# Patient Record
Sex: FEMALE | Race: OTHER | HISPANIC OR LATINO
[De-identification: names, ages, dates, MRNs, and addresses within clinical notes are randomized per-mention and may not be internally consistent; named-entity substitution may affect disease eponyms.]

---

## 2018-07-19 ENCOUNTER — APPOINTMENT (OUTPATIENT)
Dept: SPINE | Facility: CLINIC | Age: 48
End: 2018-07-19
Payer: OTHER MISCELLANEOUS

## 2018-07-19 VITALS
WEIGHT: 150 LBS | SYSTOLIC BLOOD PRESSURE: 122 MMHG | HEIGHT: 63 IN | DIASTOLIC BLOOD PRESSURE: 74 MMHG | BODY MASS INDEX: 26.58 KG/M2

## 2018-07-19 DIAGNOSIS — Z80.9 FAMILY HISTORY OF MALIGNANT NEOPLASM, UNSPECIFIED: ICD-10-CM

## 2018-07-19 DIAGNOSIS — Z82.49 FAMILY HISTORY OF ISCHEMIC HEART DISEASE AND OTHER DISEASES OF THE CIRCULATORY SYSTEM: ICD-10-CM

## 2018-07-19 DIAGNOSIS — Z87.09 PERSONAL HISTORY OF OTHER DISEASES OF THE RESPIRATORY SYSTEM: ICD-10-CM

## 2018-07-19 PROCEDURE — 99205 OFFICE O/P NEW HI 60 MIN: CPT

## 2018-07-19 RX ORDER — IBUPROFEN 200 MG/1
TABLET, FILM COATED ORAL
Refills: 0 | Status: ACTIVE | COMMUNITY

## 2018-07-19 RX ORDER — ACETAMINOPHEN 325 MG/1
TABLET, FILM COATED ORAL
Refills: 0 | Status: ACTIVE | COMMUNITY

## 2018-08-02 ENCOUNTER — APPOINTMENT (OUTPATIENT)
Dept: SPINE | Facility: CLINIC | Age: 48
End: 2018-08-02
Payer: OTHER MISCELLANEOUS

## 2018-08-02 VITALS
WEIGHT: 156 LBS | HEIGHT: 63 IN | SYSTOLIC BLOOD PRESSURE: 124 MMHG | BODY MASS INDEX: 27.64 KG/M2 | DIASTOLIC BLOOD PRESSURE: 70 MMHG

## 2018-08-02 PROCEDURE — 99213 OFFICE O/P EST LOW 20 MIN: CPT

## 2018-08-29 ENCOUNTER — OUTPATIENT (OUTPATIENT)
Dept: OUTPATIENT SERVICES | Facility: HOSPITAL | Age: 48
LOS: 1 days | End: 2018-08-29
Payer: COMMERCIAL

## 2018-08-29 VITALS
SYSTOLIC BLOOD PRESSURE: 111 MMHG | WEIGHT: 153 LBS | HEIGHT: 62.6 IN | RESPIRATION RATE: 16 BRPM | HEART RATE: 55 BPM | DIASTOLIC BLOOD PRESSURE: 75 MMHG | TEMPERATURE: 98 F | OXYGEN SATURATION: 99 %

## 2018-08-29 DIAGNOSIS — Z78.9 OTHER SPECIFIED HEALTH STATUS: ICD-10-CM

## 2018-08-29 DIAGNOSIS — Z86.018 PERSONAL HISTORY OF OTHER BENIGN NEOPLASM: Chronic | ICD-10-CM

## 2018-08-29 DIAGNOSIS — Z98.51 TUBAL LIGATION STATUS: Chronic | ICD-10-CM

## 2018-08-29 DIAGNOSIS — Z29.9 ENCOUNTER FOR PROPHYLACTIC MEASURES, UNSPECIFIED: ICD-10-CM

## 2018-08-29 DIAGNOSIS — G95.9 DISEASE OF SPINAL CORD, UNSPECIFIED: ICD-10-CM

## 2018-08-29 DIAGNOSIS — M48.02 SPINAL STENOSIS, CERVICAL REGION: ICD-10-CM

## 2018-08-29 DIAGNOSIS — Z90.710 ACQUIRED ABSENCE OF BOTH CERVIX AND UTERUS: Chronic | ICD-10-CM

## 2018-08-29 DIAGNOSIS — Z98.890 OTHER SPECIFIED POSTPROCEDURAL STATES: Chronic | ICD-10-CM

## 2018-08-29 DIAGNOSIS — Z01.818 ENCOUNTER FOR OTHER PREPROCEDURAL EXAMINATION: ICD-10-CM

## 2018-08-29 LAB
BLD GP AB SCN SERPL QL: NEGATIVE — SIGNIFICANT CHANGE UP
HCT VFR BLD CALC: 38.5 % — SIGNIFICANT CHANGE UP (ref 34.5–45)
HGB BLD-MCNC: 12.7 G/DL — SIGNIFICANT CHANGE UP (ref 11.5–15.5)
MCHC RBC-ENTMCNC: 29.7 PG — SIGNIFICANT CHANGE UP (ref 27–34)
MCHC RBC-ENTMCNC: 33 GM/DL — SIGNIFICANT CHANGE UP (ref 32–36)
MCV RBC AUTO: 90 FL — SIGNIFICANT CHANGE UP (ref 80–100)
MRSA PCR RESULT.: SIGNIFICANT CHANGE UP
PLATELET # BLD AUTO: 150 K/UL — SIGNIFICANT CHANGE UP (ref 150–400)
RBC # BLD: 4.28 M/UL — SIGNIFICANT CHANGE UP (ref 3.8–5.2)
RBC # FLD: 13.4 % — SIGNIFICANT CHANGE UP (ref 10.3–14.5)
RH IG SCN BLD-IMP: POSITIVE — SIGNIFICANT CHANGE UP
S AUREUS DNA NOSE QL NAA+PROBE: DETECTED
WBC # BLD: 4.8 K/UL — SIGNIFICANT CHANGE UP (ref 3.8–10.5)
WBC # FLD AUTO: 4.8 K/UL — SIGNIFICANT CHANGE UP (ref 3.8–10.5)

## 2018-08-29 PROCEDURE — 87640 STAPH A DNA AMP PROBE: CPT

## 2018-08-29 PROCEDURE — 87641 MR-STAPH DNA AMP PROBE: CPT

## 2018-08-29 PROCEDURE — 86900 BLOOD TYPING SEROLOGIC ABO: CPT

## 2018-08-29 PROCEDURE — 86850 RBC ANTIBODY SCREEN: CPT

## 2018-08-29 PROCEDURE — 86901 BLOOD TYPING SEROLOGIC RH(D): CPT

## 2018-08-29 PROCEDURE — 85027 COMPLETE CBC AUTOMATED: CPT

## 2018-08-29 PROCEDURE — G0463: CPT

## 2018-08-29 RX ORDER — CEFAZOLIN SODIUM 1 G
2000 VIAL (EA) INJECTION ONCE
Qty: 0 | Refills: 0 | Status: DISCONTINUED | OUTPATIENT
Start: 2018-09-07 | End: 2018-09-10

## 2018-08-29 NOTE — H&P PST ADULT - HISTORY OF PRESENT ILLNESS
49 yo Vietnamese speaking female from [place], reports an injury while at work on 5/23/18 while cleaning a toilet a piece of ceramic broke and a piece hit her on the head knocking her backward into the wall, ever since the accident she's had dizziness and bilateral arm weakness and tingling. CT scan revealed cervical cord stenosis and cord impingement C4-5 and C5-6. Pt. presents to PST today for scheduled C4-6 Anterior Cervical Discectomy and Fusion on 9/7/18. Pt. denies recent fever, chills, chest pain, SOB, changes in bowel/urinary habits.

## 2018-08-29 NOTE — H&P PST ADULT - PSH
H/O lipoma  excision, under chin  H/O vein stripping    S/P hysterectomy  2/2 fibroids 2017  S/P tubal ligation

## 2018-08-29 NOTE — H&P PST ADULT - ASSESSMENT
CAPRINI SCORE [CLOT]    AGE RELATED RISK FACTORS                                                       MOBILITY RELATED FACTORS  [x ] Age 41-60 years                                            (1 Point)                  [ ] Bed rest                                                        (1 Point)  [ ] Age: 61-74 years                                           (2 Points)                 [ ] Plaster cast                                                   (2 Points)  [ ] Age= 75 years                                              (3 Points)                 [ ] Bed bound for more than 72 hours                 (2 Points)    DISEASE RELATED RISK FACTORS                                               GENDER SPECIFIC FACTORS  [ ] Edema in the lower extremities                       (1 Point)                  [ ] Pregnancy                                                     (1 Point)  [ ] Varicose veins                                               (1 Point)                  [ ] Post-partum < 6 weeks                                   (1 Point)             [x ] BMI > 25 Kg/m2                                            (1 Point)                  [ ] Hormonal therapy  or oral contraception          (1 Point)                 [ ] Sepsis (in the previous month)                        (1 Point)                  [ ] History of pregnancy complications                 (1 point)  [ ] Pneumonia or serious lung disease                                               [ ] Unexplained or recurrent                     (1 Point)           (in the previous month)                               (1 Point)  [ ] Abnormal pulmonary function test                     (1 Point)                 SURGERY RELATED RISK FACTORS  [ ] Acute myocardial infarction                              (1 Point)                 [ ]  Section                                             (1 Point)  [ ] Congestive heart failure (in the previous month)  (1 Point)               [ ] Minor surgery                                                  (1 Point)   [ ] Inflammatory bowel disease                             (1 Point)                 [ ] Arthroscopic surgery                                        (2 Points)  [ ] Central venous access                                      (2 Points)                [x ] General surgery lasting more than 45 minutes   (2 Points)       [ ] Stroke (in the previous month)                          (5 Points)               [ ] Elective arthroplasty                                         (5 Points)                                                                                                                                               HEMATOLOGY RELATED FACTORS                                                 TRAUMA RELATED RISK FACTORS  [ ] Prior episodes of VTE                                     (3 Points)                [ ] Fracture of the hip, pelvis, or leg                       (5 Points)  [ ] Positive family history for VTE                         (3 Points)                 [ ] Acute spinal cord injury (in the previous month)  (5 Points)  [ ] Prothrombin 14931 A                                     (3 Points)                 [ ] Paralysis  (less than 1 month)                             (5 Points)  [ ] Factor V Leiden                                             (3 Points)                  [ ] Multiple Trauma within 1 month                        (5 Points)  [ ] Lupus anticoagulants                                     (3 Points)                                                           [ ] Anticardiolipin antibodies                               (3 Points)                                                       [ ] High homocysteine in the blood                      (3 Points)                                             [ ] Other congenital or acquired thrombophilia      (3 Points)                                                [ ] Heparin induced thrombocytopenia                  (3 Points)                                          Total Score [    4      ]

## 2018-08-29 NOTE — H&P PST ADULT - PMH
Asthma  resolved, does not use any inhalers (hospitilized 12 years ago for exacerbation while living in [place])  Cervical stenosis of spinal canal  and nerve impigement Asthma  resolved, does not use any inhalers (hospitilized 12 years ago for exacerbation while living in [place])  Cervical stenosis of spinal canal  and nerve impigement  Constipation  manages with diet

## 2018-08-29 NOTE — H&P PST ADULT - PROBLEM SELECTOR PLAN 1
C4-6 anterior Cervical Discectomy and Fusion  Pre-op instructions, including Chlorhexidine soap, provided - all questions answered

## 2018-08-29 NOTE — H&P PST ADULT - NSANTHOSAYNRD_GEN_A_CORE
No. PATRICE screening performed.  STOP BANG Legend: 0-2 = LOW Risk; 3-4 = INTERMEDIATE Risk; 5-8 = HIGH Risk

## 2018-08-31 ENCOUNTER — CHART COPY (OUTPATIENT)
Age: 48
End: 2018-08-31

## 2018-09-05 PROBLEM — J45.909 UNSPECIFIED ASTHMA, UNCOMPLICATED: Chronic | Status: ACTIVE | Noted: 2018-08-29

## 2018-09-05 PROBLEM — M48.02 SPINAL STENOSIS, CERVICAL REGION: Chronic | Status: ACTIVE | Noted: 2018-08-29

## 2018-09-05 PROBLEM — K59.00 CONSTIPATION, UNSPECIFIED: Chronic | Status: ACTIVE | Noted: 2018-08-29

## 2018-09-06 ENCOUNTER — NON-APPOINTMENT (OUTPATIENT)
Age: 48
End: 2018-09-06

## 2018-09-06 ENCOUNTER — APPOINTMENT (OUTPATIENT)
Dept: FAMILY MEDICINE | Facility: CLINIC | Age: 48
End: 2018-09-06
Payer: OTHER MISCELLANEOUS

## 2018-09-06 ENCOUNTER — TRANSCRIPTION ENCOUNTER (OUTPATIENT)
Age: 48
End: 2018-09-06

## 2018-09-06 DIAGNOSIS — Z92.89 PERSONAL HISTORY OF OTHER MEDICAL TREATMENT: ICD-10-CM

## 2018-09-06 DIAGNOSIS — Z82.3 FAMILY HISTORY OF STROKE: ICD-10-CM

## 2018-09-06 PROCEDURE — 99204 OFFICE O/P NEW MOD 45 MIN: CPT | Mod: 25

## 2018-09-06 PROCEDURE — 36415 COLL VENOUS BLD VENIPUNCTURE: CPT

## 2018-09-06 RX ORDER — NITROFURANTOIN (MONOHYDRATE/MACROCRYSTALS) 25; 75 MG/1; MG/1
100 CAPSULE ORAL
Qty: 14 | Refills: 0 | Status: COMPLETED | COMMUNITY
Start: 2018-05-14

## 2018-09-06 RX ORDER — CYCLOBENZAPRINE HYDROCHLORIDE 10 MG/1
10 TABLET, FILM COATED ORAL
Qty: 10 | Refills: 0 | Status: COMPLETED | COMMUNITY
Start: 2018-05-25

## 2018-09-06 RX ORDER — CIPROFLOXACIN HYDROCHLORIDE 500 MG/1
500 TABLET, FILM COATED ORAL
Qty: 10 | Refills: 0 | Status: COMPLETED | COMMUNITY
Start: 2018-05-14

## 2018-09-06 NOTE — PHYSICAL EXAM

## 2018-09-06 NOTE — HISTORY OF PRESENT ILLNESS
[No Pertinent Cardiac History] : no history of aortic stenosis, atrial fibrillation, coronary artery disease, recent myocardial infarction, or implantable device/pacemaker [FreeTextEntry1] : c4-6 Anterior Cervical Discectomy & fusion [FreeTextEntry2] : 9/7/18 [FreeTextEntry3] : Dr. Gonzalez Montgomery [FreeTextEntry4] : s/p accident at work\par c/o neck, back, leg pain, headache, rt hand and leg weakness\par unable to sit or stand for prolonged periods of time

## 2018-09-07 ENCOUNTER — INPATIENT (INPATIENT)
Facility: HOSPITAL | Age: 48
LOS: 2 days | Discharge: HOME | DRG: 472 | End: 2018-09-10
Attending: NEUROLOGICAL SURGERY | Admitting: NEUROLOGICAL SURGERY
Payer: COMMERCIAL

## 2018-09-07 ENCOUNTER — APPOINTMENT (OUTPATIENT)
Dept: SPINE | Facility: HOSPITAL | Age: 48
End: 2018-09-07

## 2018-09-07 VITALS
WEIGHT: 156.09 LBS | TEMPERATURE: 98 F | DIASTOLIC BLOOD PRESSURE: 80 MMHG | HEART RATE: 60 BPM | RESPIRATION RATE: 18 BRPM | HEIGHT: 63 IN | OXYGEN SATURATION: 100 % | SYSTOLIC BLOOD PRESSURE: 124 MMHG

## 2018-09-07 DIAGNOSIS — Z98.890 OTHER SPECIFIED POSTPROCEDURAL STATES: Chronic | ICD-10-CM

## 2018-09-07 DIAGNOSIS — Z90.710 ACQUIRED ABSENCE OF BOTH CERVIX AND UTERUS: Chronic | ICD-10-CM

## 2018-09-07 DIAGNOSIS — Z98.51 TUBAL LIGATION STATUS: Chronic | ICD-10-CM

## 2018-09-07 DIAGNOSIS — G95.9 DISEASE OF SPINAL CORD, UNSPECIFIED: ICD-10-CM

## 2018-09-07 DIAGNOSIS — Z86.018 PERSONAL HISTORY OF OTHER BENIGN NEOPLASM: Chronic | ICD-10-CM

## 2018-09-07 LAB
ANION GAP SERPL CALC-SCNC: 12 MMOL/L — SIGNIFICANT CHANGE UP (ref 5–17)
BASOPHILS # BLD AUTO: 0 K/UL — SIGNIFICANT CHANGE UP (ref 0–0.2)
BASOPHILS NFR BLD AUTO: 0.3 % — SIGNIFICANT CHANGE UP (ref 0–2)
BUN SERPL-MCNC: 10 MG/DL — SIGNIFICANT CHANGE UP (ref 7–23)
CALCIUM SERPL-MCNC: 9.1 MG/DL — SIGNIFICANT CHANGE UP (ref 8.4–10.5)
CHLORIDE SERPL-SCNC: 104 MMOL/L — SIGNIFICANT CHANGE UP (ref 96–108)
CO2 SERPL-SCNC: 24 MMOL/L — SIGNIFICANT CHANGE UP (ref 22–31)
CREAT SERPL-MCNC: 0.8 MG/DL — SIGNIFICANT CHANGE UP (ref 0.5–1.3)
EOSINOPHIL # BLD AUTO: 0 K/UL — SIGNIFICANT CHANGE UP (ref 0–0.5)
EOSINOPHIL NFR BLD AUTO: 0.5 % — SIGNIFICANT CHANGE UP (ref 0–6)
GLUCOSE BLDC GLUCOMTR-MCNC: 119 MG/DL — HIGH (ref 70–99)
GLUCOSE SERPL-MCNC: 108 MG/DL — HIGH (ref 70–99)
HCT VFR BLD CALC: 39.1 % — SIGNIFICANT CHANGE UP (ref 34.5–45)
HGB BLD-MCNC: 13.3 G/DL — SIGNIFICANT CHANGE UP (ref 11.5–15.5)
LYMPHOCYTES # BLD AUTO: 1.2 K/UL — SIGNIFICANT CHANGE UP (ref 1–3.3)
LYMPHOCYTES # BLD AUTO: 18.8 % — SIGNIFICANT CHANGE UP (ref 13–44)
MCHC RBC-ENTMCNC: 31 PG — SIGNIFICANT CHANGE UP (ref 27–34)
MCHC RBC-ENTMCNC: 34.2 GM/DL — SIGNIFICANT CHANGE UP (ref 32–36)
MCV RBC AUTO: 90.7 FL — SIGNIFICANT CHANGE UP (ref 80–100)
MONOCYTES # BLD AUTO: 0.2 K/UL — SIGNIFICANT CHANGE UP (ref 0–0.9)
MONOCYTES NFR BLD AUTO: 2.5 % — SIGNIFICANT CHANGE UP (ref 2–14)
NEUTROPHILS # BLD AUTO: 4.9 K/UL — SIGNIFICANT CHANGE UP (ref 1.8–7.4)
NEUTROPHILS NFR BLD AUTO: 77.8 % — HIGH (ref 43–77)
PLATELET # BLD AUTO: 134 K/UL — LOW (ref 150–400)
POTASSIUM SERPL-MCNC: 3.8 MMOL/L — SIGNIFICANT CHANGE UP (ref 3.5–5.3)
POTASSIUM SERPL-SCNC: 3.8 MMOL/L — SIGNIFICANT CHANGE UP (ref 3.5–5.3)
RBC # BLD: 4.31 M/UL — SIGNIFICANT CHANGE UP (ref 3.8–5.2)
RBC # FLD: 12.6 % — SIGNIFICANT CHANGE UP (ref 10.3–14.5)
RH IG SCN BLD-IMP: POSITIVE — SIGNIFICANT CHANGE UP
SODIUM SERPL-SCNC: 140 MMOL/L — SIGNIFICANT CHANGE UP (ref 135–145)
WBC # BLD: 6.2 K/UL — SIGNIFICANT CHANGE UP (ref 3.8–10.5)
WBC # FLD AUTO: 6.2 K/UL — SIGNIFICANT CHANGE UP (ref 3.8–10.5)

## 2018-09-07 PROCEDURE — 99291 CRITICAL CARE FIRST HOUR: CPT

## 2018-09-07 PROCEDURE — 22853 INSJ BIOMECHANICAL DEVICE: CPT | Mod: 59,GC

## 2018-09-07 PROCEDURE — 22552 ARTHRD ANT NTRBD CERVICAL EA: CPT | Mod: GC

## 2018-09-07 PROCEDURE — 22551 ARTHRD ANT NTRBDY CERVICAL: CPT | Mod: GC

## 2018-09-07 RX ORDER — GABAPENTIN 400 MG/1
300 CAPSULE ORAL AT BEDTIME
Qty: 0 | Refills: 0 | Status: DISCONTINUED | OUTPATIENT
Start: 2018-09-07 | End: 2018-09-10

## 2018-09-07 RX ORDER — ENOXAPARIN SODIUM 100 MG/ML
40 INJECTION SUBCUTANEOUS DAILY
Qty: 0 | Refills: 0 | Status: DISCONTINUED | OUTPATIENT
Start: 2018-09-08 | End: 2018-09-10

## 2018-09-07 RX ORDER — ONDANSETRON 8 MG/1
4 TABLET, FILM COATED ORAL ONCE
Qty: 0 | Refills: 0 | Status: DISCONTINUED | OUTPATIENT
Start: 2018-09-07 | End: 2018-09-08

## 2018-09-07 RX ORDER — DEXTROSE MONOHYDRATE, SODIUM CHLORIDE, AND POTASSIUM CHLORIDE 50; .745; 4.5 G/1000ML; G/1000ML; G/1000ML
1000 INJECTION, SOLUTION INTRAVENOUS
Qty: 0 | Refills: 0 | Status: DISCONTINUED | OUTPATIENT
Start: 2018-09-07 | End: 2018-09-10

## 2018-09-07 RX ORDER — OXYCODONE HYDROCHLORIDE 5 MG/1
5 TABLET ORAL EVERY 4 HOURS
Qty: 0 | Refills: 0 | Status: DISCONTINUED | OUTPATIENT
Start: 2018-09-07 | End: 2018-09-08

## 2018-09-07 RX ORDER — DEXAMETHASONE 0.5 MG/5ML
4 ELIXIR ORAL EVERY 6 HOURS
Qty: 0 | Refills: 0 | Status: DISCONTINUED | OUTPATIENT
Start: 2018-09-07 | End: 2018-09-07

## 2018-09-07 RX ORDER — GABAPENTIN 400 MG/1
1 CAPSULE ORAL
Qty: 0 | Refills: 0 | COMMUNITY

## 2018-09-07 RX ORDER — ACETAMINOPHEN 500 MG
650 TABLET ORAL EVERY 6 HOURS
Qty: 0 | Refills: 0 | Status: DISCONTINUED | OUTPATIENT
Start: 2018-09-07 | End: 2018-09-08

## 2018-09-07 RX ORDER — DEXAMETHASONE 0.5 MG/5ML
4 ELIXIR ORAL EVERY 6 HOURS
Qty: 0 | Refills: 0 | Status: COMPLETED | OUTPATIENT
Start: 2018-09-07 | End: 2018-09-08

## 2018-09-07 RX ORDER — LIDOCAINE HCL 20 MG/ML
0.2 VIAL (ML) INJECTION ONCE
Qty: 0 | Refills: 0 | Status: DISCONTINUED | OUTPATIENT
Start: 2018-09-07 | End: 2018-09-07

## 2018-09-07 RX ORDER — INFLUENZA VIRUS VACCINE 15; 15; 15; 15 UG/.5ML; UG/.5ML; UG/.5ML; UG/.5ML
0.5 SUSPENSION INTRAMUSCULAR ONCE
Qty: 0 | Refills: 0 | Status: DISCONTINUED | OUTPATIENT
Start: 2018-09-07 | End: 2018-09-10

## 2018-09-07 RX ORDER — PANTOPRAZOLE SODIUM 20 MG/1
40 TABLET, DELAYED RELEASE ORAL
Qty: 0 | Refills: 0 | Status: DISCONTINUED | OUTPATIENT
Start: 2018-09-07 | End: 2018-09-10

## 2018-09-07 RX ORDER — HYDROMORPHONE HYDROCHLORIDE 2 MG/ML
0.5 INJECTION INTRAMUSCULAR; INTRAVENOUS; SUBCUTANEOUS
Qty: 0 | Refills: 0 | Status: DISCONTINUED | OUTPATIENT
Start: 2018-09-07 | End: 2018-09-08

## 2018-09-07 RX ORDER — SODIUM CHLORIDE 9 MG/ML
3 INJECTION INTRAMUSCULAR; INTRAVENOUS; SUBCUTANEOUS EVERY 8 HOURS
Qty: 0 | Refills: 0 | Status: DISCONTINUED | OUTPATIENT
Start: 2018-09-07 | End: 2018-09-07

## 2018-09-07 RX ORDER — ACETAMINOPHEN 500 MG
1000 TABLET ORAL ONCE
Qty: 0 | Refills: 0 | Status: COMPLETED | OUTPATIENT
Start: 2018-09-07 | End: 2018-09-07

## 2018-09-07 RX ORDER — DOCUSATE SODIUM 100 MG
100 CAPSULE ORAL THREE TIMES A DAY
Qty: 0 | Refills: 0 | Status: DISCONTINUED | OUTPATIENT
Start: 2018-09-07 | End: 2018-09-10

## 2018-09-07 RX ORDER — HYDROMORPHONE HYDROCHLORIDE 2 MG/ML
0.25 INJECTION INTRAMUSCULAR; INTRAVENOUS; SUBCUTANEOUS
Qty: 0 | Refills: 0 | Status: DISCONTINUED | OUTPATIENT
Start: 2018-09-07 | End: 2018-09-08

## 2018-09-07 RX ORDER — CEFAZOLIN SODIUM 1 G
2000 VIAL (EA) INJECTION EVERY 8 HOURS
Qty: 0 | Refills: 0 | Status: COMPLETED | OUTPATIENT
Start: 2018-09-07 | End: 2018-09-08

## 2018-09-07 RX ADMIN — DEXTROSE MONOHYDRATE, SODIUM CHLORIDE, AND POTASSIUM CHLORIDE 75 MILLILITER(S): 50; .745; 4.5 INJECTION, SOLUTION INTRAVENOUS at 17:06

## 2018-09-07 RX ADMIN — Medication 400 MILLIGRAM(S): at 20:14

## 2018-09-07 RX ADMIN — Medication 1000 MILLIGRAM(S): at 20:45

## 2018-09-07 RX ADMIN — Medication 4 MILLIGRAM(S): at 23:46

## 2018-09-07 RX ADMIN — HYDROMORPHONE HYDROCHLORIDE 0.5 MILLIGRAM(S): 2 INJECTION INTRAMUSCULAR; INTRAVENOUS; SUBCUTANEOUS at 15:35

## 2018-09-07 RX ADMIN — Medication 4 MILLIGRAM(S): at 18:38

## 2018-09-07 RX ADMIN — HYDROMORPHONE HYDROCHLORIDE 0.5 MILLIGRAM(S): 2 INJECTION INTRAMUSCULAR; INTRAVENOUS; SUBCUTANEOUS at 16:17

## 2018-09-07 RX ADMIN — HYDROMORPHONE HYDROCHLORIDE 0.5 MILLIGRAM(S): 2 INJECTION INTRAMUSCULAR; INTRAVENOUS; SUBCUTANEOUS at 15:21

## 2018-09-07 RX ADMIN — HYDROMORPHONE HYDROCHLORIDE 0.5 MILLIGRAM(S): 2 INJECTION INTRAMUSCULAR; INTRAVENOUS; SUBCUTANEOUS at 16:47

## 2018-09-07 RX ADMIN — Medication 100 MILLIGRAM(S): at 21:31

## 2018-09-07 NOTE — PROGRESS NOTE ADULT - SUBJECTIVE AND OBJECTIVE BOX
SUMMARY:  48 year-old [facility] woman with asthma admitted 9/7/18 for elective C4-6 anterior cervical discectomy and fusion for cervical cord stenosis with C4-5 and C5-6 cord impingement found on work-up of bilateral arm numbness and radiculopathy.     24 HOUR EVENTS:  Transient depression in mental status (sleepy) that self-resolved and attributed to narcotics.    REVIEW OF SYSTEMS:  Pain controlled, no chest pain, no shortness of breath    VITALS/DATA/ORDERS: [x] Reviewed    EXAMINATION:  General: No acute distress  HEENT: Anicteric sclerae, no neck hematoma  Cardiac: [id number]  Lungs: Clear  Abdomen: Soft, non-tender, +BS  Extremities: No c/c/e  Skin/Incision Site: Clean, dry and intact  Neurologic: Awake, alert, fully oriented, follows commands, PERRL, EOMI, face symmetric, tongue midline, no drift, full strength SUMMARY:  48 year-old [facility] woman with asthma admitted 9/7/18 for elective C4-6 anterior cervical discectomy and fusion for cervical cord stenosis with C4-5 and C5-6 cord impingement found on work-up of bilateral arm numbness and radiculopathy.     24 HOUR EVENTS:  Transient depression in mental status (sleepy) that self-resolved and attributed to narcotics.    REVIEW OF SYSTEMS:  Pain controlled with pain meds but worsens if moves a lot, no chest pain, no shortness of breath    VITALS/DATA/ORDERS: [x] Reviewed    EXAMINATION:  General: NAD  HEENT: Anicteric sclerae, no neck hematoma  Cardiac: [id number]  Lungs: Clear  Abdomen: Soft, non-tender, +BS  Extremities: No c/c/e  Skin/Incision Site: Clean, dry and intact  Neurologic: Awake, alert, fully oriented, follows commands, PERRL, EOMI, face symmetric, tongue midline, antigravity in all limbs, likely stronger than 4/5 throughout but poor effort/pain limited

## 2018-09-07 NOTE — PRE-OP CHECKLIST - 1.
Emotional support and pre op teaching provided to patient and family with verbalized understanding in Romanian using  phone services.

## 2018-09-07 NOTE — PROGRESS NOTE ADULT - ASSESSMENT
ASSESSMENT/PLAN: Cervical stenosis with radiculopathy, post-operative day 1 from C4-6 ACDF    NEURO:  Pain control but avoid over-narcotization  Continue gabapentin  Activity: [x] mobilize as tolerated [] Bedrest [] PT [] OT [] PMNR    PULM:  Asthma: Nebs PRN    CV:  SBP goal 100-140mmHg    RENAL:  Fluids: IVF until good POs    GI:  Diet: Advance as tolerated  GI prophylaxis [x] not indicated [] PPI [] other:  Bowel regimen [x] colace [x] senna [] other:    ENDO:   Goal euglycemia (-180)    HEME/ONC:  VTE prophylaxis: [x] SCDs [] chemoprophylaxis [x] hold chemoprophylaxis due to: fresh post-op [] high risk of DVT/PE on admission due to:    ID:  Vaishali-op antibiotics     SOCIAL/FAMILY:  [x] awaiting [] updated at bedside [] family meeting    CODE STATUS:  [x] Full Code [] DNR [] DNI [] Palliative/Comfort Care    DISPOSITION:  [x] ICU [] Stroke Unit [] Floor [] EMU [] RCU [] PCU    [x] Patient is at high risk of neurologic deterioration/death due to: monitor for neck hematoma with airway compromise    Time spent: 30 [x] critical care minutes    Contact: [phone]

## 2018-09-08 LAB
ANION GAP SERPL CALC-SCNC: 15 MMOL/L — SIGNIFICANT CHANGE UP (ref 5–17)
BUN SERPL-MCNC: 12 MG/DL — SIGNIFICANT CHANGE UP (ref 7–23)
CALCIUM SERPL-MCNC: 8.7 MG/DL — SIGNIFICANT CHANGE UP (ref 8.4–10.5)
CHLORIDE SERPL-SCNC: 104 MMOL/L — SIGNIFICANT CHANGE UP (ref 96–108)
CO2 SERPL-SCNC: 19 MMOL/L — LOW (ref 22–31)
CREAT SERPL-MCNC: 0.64 MG/DL — SIGNIFICANT CHANGE UP (ref 0.5–1.3)
GLUCOSE SERPL-MCNC: 121 MG/DL — HIGH (ref 70–99)
HCT VFR BLD CALC: 38.9 % — SIGNIFICANT CHANGE UP (ref 34.5–45)
HGB BLD-MCNC: 13.1 G/DL — SIGNIFICANT CHANGE UP (ref 11.5–15.5)
MCHC RBC-ENTMCNC: 30.6 PG — SIGNIFICANT CHANGE UP (ref 27–34)
MCHC RBC-ENTMCNC: 33.6 GM/DL — SIGNIFICANT CHANGE UP (ref 32–36)
MCV RBC AUTO: 91 FL — SIGNIFICANT CHANGE UP (ref 80–100)
PLATELET # BLD AUTO: 138 K/UL — LOW (ref 150–400)
POTASSIUM SERPL-MCNC: 4 MMOL/L — SIGNIFICANT CHANGE UP (ref 3.5–5.3)
POTASSIUM SERPL-SCNC: 4 MMOL/L — SIGNIFICANT CHANGE UP (ref 3.5–5.3)
RBC # BLD: 4.27 M/UL — SIGNIFICANT CHANGE UP (ref 3.8–5.2)
RBC # FLD: 12.5 % — SIGNIFICANT CHANGE UP (ref 10.3–14.5)
SODIUM SERPL-SCNC: 138 MMOL/L — SIGNIFICANT CHANGE UP (ref 135–145)
WBC # BLD: 10.4 K/UL — SIGNIFICANT CHANGE UP (ref 3.8–10.5)
WBC # FLD AUTO: 10.4 K/UL — SIGNIFICANT CHANGE UP (ref 3.8–10.5)

## 2018-09-08 RX ORDER — TRAMADOL HYDROCHLORIDE 50 MG/1
25 TABLET ORAL EVERY 4 HOURS
Qty: 0 | Refills: 0 | Status: DISCONTINUED | OUTPATIENT
Start: 2018-09-08 | End: 2018-09-10

## 2018-09-08 RX ORDER — TRAMADOL HYDROCHLORIDE 50 MG/1
50 TABLET ORAL EVERY 4 HOURS
Qty: 0 | Refills: 0 | Status: DISCONTINUED | OUTPATIENT
Start: 2018-09-08 | End: 2018-09-10

## 2018-09-08 RX ORDER — ACETAMINOPHEN 500 MG
975 TABLET ORAL EVERY 8 HOURS
Qty: 0 | Refills: 0 | Status: COMPLETED | OUTPATIENT
Start: 2018-09-08 | End: 2018-09-08

## 2018-09-08 RX ADMIN — Medication 4 MILLIGRAM(S): at 11:56

## 2018-09-08 RX ADMIN — TRAMADOL HYDROCHLORIDE 50 MILLIGRAM(S): 50 TABLET ORAL at 19:39

## 2018-09-08 RX ADMIN — Medication 975 MILLIGRAM(S): at 05:36

## 2018-09-08 RX ADMIN — Medication 975 MILLIGRAM(S): at 21:37

## 2018-09-08 RX ADMIN — TRAMADOL HYDROCHLORIDE 50 MILLIGRAM(S): 50 TABLET ORAL at 10:30

## 2018-09-08 RX ADMIN — TRAMADOL HYDROCHLORIDE 25 MILLIGRAM(S): 50 TABLET ORAL at 01:00

## 2018-09-08 RX ADMIN — Medication 975 MILLIGRAM(S): at 14:25

## 2018-09-08 RX ADMIN — GABAPENTIN 300 MILLIGRAM(S): 400 CAPSULE ORAL at 21:37

## 2018-09-08 RX ADMIN — TRAMADOL HYDROCHLORIDE 50 MILLIGRAM(S): 50 TABLET ORAL at 21:00

## 2018-09-08 RX ADMIN — Medication 100 MILLIGRAM(S): at 21:37

## 2018-09-08 RX ADMIN — Medication 975 MILLIGRAM(S): at 06:10

## 2018-09-08 RX ADMIN — Medication 100 MILLIGRAM(S): at 05:36

## 2018-09-08 RX ADMIN — TRAMADOL HYDROCHLORIDE 50 MILLIGRAM(S): 50 TABLET ORAL at 09:27

## 2018-09-08 RX ADMIN — Medication 100 MILLIGRAM(S): at 14:25

## 2018-09-08 RX ADMIN — Medication 975 MILLIGRAM(S): at 01:15

## 2018-09-08 RX ADMIN — Medication 100 MILLIGRAM(S): at 14:28

## 2018-09-08 RX ADMIN — ENOXAPARIN SODIUM 40 MILLIGRAM(S): 100 INJECTION SUBCUTANEOUS at 11:56

## 2018-09-08 RX ADMIN — TRAMADOL HYDROCHLORIDE 25 MILLIGRAM(S): 50 TABLET ORAL at 00:30

## 2018-09-08 RX ADMIN — Medication 4 MILLIGRAM(S): at 05:36

## 2018-09-08 NOTE — PROGRESS NOTE ADULT - ASSESSMENT
48F s/p C4-6 ACDF  - Pain control with tramadol  - +OOB, orthostatic, encourage fluid intake   - PT to re-eval tomorrow  - SQL for DVT ppx

## 2018-09-08 NOTE — PROGRESS NOTE ADULT - SUBJECTIVE AND OBJECTIVE BOX
Visit Summary: Patient seen and evaluated at bedside.    Overnight Events: none    Exam:  T(C): 36.5 (09-07-18 @ 22:00), Max: 36.6 (09-07-18 @ 18:45)  HR: 58 (09-07-18 @ 23:00) (58 - 89)  BP: 112/64 (09-07-18 @ 23:00) (100/55 - 127/72)  RR: 16 (09-07-18 @ 23:00) (14 - 18)  SpO2: 97% (09-07-18 @ 23:00) (96% - 100%)  Wt(kg): --    AOx3, FC, PERRL, EOMI, no facial   4+/5 throughout diffusley effort limited, no drift  R Hand numbness (preop)  no clonus                          13.3   6.2   )-----------( 134      ( 07 Sep 2018 15:31 )             39.1     09-07    140  |  104  |  10  ----------------------------<  108<H>  3.8   |  24  |  0.80    Ca    9.1      07 Sep 2018 15:31

## 2018-09-08 NOTE — PROGRESS NOTE ADULT - REASON FOR ADMISSION
injury while at work on 5/23/18 while cleaning a toilet a piece of ceramic broke and a piece hit her on the head knocking her backward into the wall, ever since the accident she's had dizziness and bilateral arm weakness and tingling.

## 2018-09-08 NOTE — PHYSICAL THERAPY INITIAL EVALUATION ADULT - PLANNED THERAPY INTERVENTIONS, PT EVAL
bed mobility training/GOAL: Pt will perform 6 stairs with or without U HR as needed within 3-4weeks./gait training/transfer training

## 2018-09-08 NOTE — PROGRESS NOTE ADULT - SUBJECTIVE AND OBJECTIVE BOX
Visit Summary: Patient seen and evaluated at bedside.    Overnight Events: none. Orthostatic with PT this AM.     Exam:  Vital Signs Last 24 Hrs  T(C): 36.8 (08 Sep 2018 09:23), Max: 37.1 (08 Sep 2018 06:23)  T(F): 98.3 (08 Sep 2018 09:23), Max: 98.7 (08 Sep 2018 06:23)  HR: 70 (08 Sep 2018 10:59) (58 - 89)  BP: 88/66 (08 Sep 2018 10:59) (88/66 - 127/72)  BP(mean): 95 (08 Sep 2018 05:50) (72 - 95)  RR: 16 (08 Sep 2018 09:23) (14 - 18)  SpO2: 96% (08 Sep 2018 09:23) (96% - 100%)    AOx3, FC, PERRL, EOMI, no facial   4+/5 throughout diffusley effort limited, no drift  R Hand numbness (preop)  no clonus                                     13.1   10.4  )-----------( 138      ( 08 Sep 2018 04:02 )             38.9   09-08    138  |  104  |  12  ----------------------------<  121<H>  4.0   |  19<L>  |  0.64    Ca    8.7      08 Sep 2018 04:02      MEDICATIONS  (STANDING):  acetaminophen   Tablet .. 975 milliGRAM(s) Oral every 8 hours  ceFAZolin   IVPB 2000 milliGRAM(s) IV Intermittent every 8 hours  ceFAZolin   IVPB 2000 milliGRAM(s) IV Intermittent once  docusate sodium 100 milliGRAM(s) Oral three times a day  enoxaparin Injectable 40 milliGRAM(s) SubCutaneous daily  gabapentin 300 milliGRAM(s) Oral at bedtime  influenza   Vaccine 0.5 milliLiter(s) IntraMuscular once  pantoprazole    Tablet 40 milliGRAM(s) Oral before breakfast  sodium chloride 0.9% with potassium chloride 20 mEq/L 1000 milliLiter(s) (75 mL/Hr) IV Continuous <Continuous>    MEDICATIONS  (PRN):  traMADol 25 milliGRAM(s) Oral every 4 hours PRN Moderate Pain (4 - 6)  traMADol 50 milliGRAM(s) Oral every 4 hours PRN Severe Pain (7 - 10)

## 2018-09-08 NOTE — PHYSICAL THERAPY INITIAL EVALUATION ADULT - ADDITIONAL COMMENTS
As per pt, pt lives in a private house w/ her son and  w/ 6 steps to enter and UHR. PTA pt was independent w/ all ADLs and mobility

## 2018-09-09 RX ADMIN — TRAMADOL HYDROCHLORIDE 50 MILLIGRAM(S): 50 TABLET ORAL at 22:36

## 2018-09-09 RX ADMIN — DEXTROSE MONOHYDRATE, SODIUM CHLORIDE, AND POTASSIUM CHLORIDE 75 MILLILITER(S): 50; .745; 4.5 INJECTION, SOLUTION INTRAVENOUS at 22:24

## 2018-09-09 RX ADMIN — ENOXAPARIN SODIUM 40 MILLIGRAM(S): 100 INJECTION SUBCUTANEOUS at 11:29

## 2018-09-09 RX ADMIN — TRAMADOL HYDROCHLORIDE 50 MILLIGRAM(S): 50 TABLET ORAL at 23:10

## 2018-09-09 RX ADMIN — GABAPENTIN 300 MILLIGRAM(S): 400 CAPSULE ORAL at 22:28

## 2018-09-09 RX ADMIN — Medication 100 MILLIGRAM(S): at 18:40

## 2018-09-09 RX ADMIN — TRAMADOL HYDROCHLORIDE 50 MILLIGRAM(S): 50 TABLET ORAL at 07:15

## 2018-09-09 RX ADMIN — PANTOPRAZOLE SODIUM 40 MILLIGRAM(S): 20 TABLET, DELAYED RELEASE ORAL at 05:53

## 2018-09-09 RX ADMIN — Medication 100 MILLIGRAM(S): at 05:53

## 2018-09-09 RX ADMIN — TRAMADOL HYDROCHLORIDE 50 MILLIGRAM(S): 50 TABLET ORAL at 19:20

## 2018-09-09 RX ADMIN — TRAMADOL HYDROCHLORIDE 50 MILLIGRAM(S): 50 TABLET ORAL at 05:55

## 2018-09-09 RX ADMIN — Medication 975 MILLIGRAM(S): at 00:04

## 2018-09-09 RX ADMIN — TRAMADOL HYDROCHLORIDE 50 MILLIGRAM(S): 50 TABLET ORAL at 18:40

## 2018-09-09 RX ADMIN — TRAMADOL HYDROCHLORIDE 50 MILLIGRAM(S): 50 TABLET ORAL at 12:30

## 2018-09-09 RX ADMIN — Medication 100 MILLIGRAM(S): at 22:31

## 2018-09-09 RX ADMIN — TRAMADOL HYDROCHLORIDE 50 MILLIGRAM(S): 50 TABLET ORAL at 11:30

## 2018-09-09 NOTE — PROGRESS NOTE ADULT - SUBJECTIVE AND OBJECTIVE BOX
Visit Summary: Patient seen and evaluated at bedside.    Overnight Events: none. Orthostatic with PT this AM.     Exam:  Vital Signs Last 24 Hrs  T(C): 36.9 (09 Sep 2018 05:35), Max: 36.9 (09 Sep 2018 05:35)  T(F): 98.5 (09 Sep 2018 05:35), Max: 98.5 (09 Sep 2018 05:35)  HR: 67 (09 Sep 2018 05:35) (66 - 79)  BP: 101/62 (09 Sep 2018 05:35) (88/66 - 109/73)  BP(mean): --  RR: 17 (09 Sep 2018 05:35) (16 - 18)  SpO2: 95% (09 Sep 2018 05:35) (95% - 98%)    AOx3, FC, PERRL, EOMI, no facial   4+/5 throughout diffusley effort limited, no drift  R Hand numbness (preop)  no clonus

## 2018-09-10 ENCOUNTER — TRANSCRIPTION ENCOUNTER (OUTPATIENT)
Age: 48
End: 2018-09-10

## 2018-09-10 VITALS
DIASTOLIC BLOOD PRESSURE: 81 MMHG | OXYGEN SATURATION: 100 % | TEMPERATURE: 98 F | SYSTOLIC BLOOD PRESSURE: 123 MMHG | HEART RATE: 65 BPM | RESPIRATION RATE: 16 BRPM

## 2018-09-10 RX ORDER — TRAMADOL HYDROCHLORIDE 50 MG/1
0.5 TABLET ORAL
Qty: 0 | Refills: 0 | COMMUNITY
Start: 2018-09-10

## 2018-09-10 RX ORDER — TRAMADOL HYDROCHLORIDE 50 MG/1
1 TABLET ORAL
Qty: 42 | Refills: 0 | OUTPATIENT
Start: 2018-09-10 | End: 2018-09-16

## 2018-09-10 RX ORDER — DOCUSATE SODIUM 100 MG
1 CAPSULE ORAL
Qty: 0 | Refills: 0 | COMMUNITY
Start: 2018-09-10

## 2018-09-10 RX ORDER — PANTOPRAZOLE SODIUM 20 MG/1
1 TABLET, DELAYED RELEASE ORAL
Qty: 6 | Refills: 0 | OUTPATIENT
Start: 2018-09-10 | End: 2018-09-15

## 2018-09-10 RX ORDER — DEXAMETHASONE 0.5 MG/5ML
4 ELIXIR ORAL EVERY 8 HOURS
Qty: 0 | Refills: 0 | Status: DISCONTINUED | OUTPATIENT
Start: 2018-09-10 | End: 2018-09-10

## 2018-09-10 RX ADMIN — TRAMADOL HYDROCHLORIDE 50 MILLIGRAM(S): 50 TABLET ORAL at 10:42

## 2018-09-10 RX ADMIN — TRAMADOL HYDROCHLORIDE 50 MILLIGRAM(S): 50 TABLET ORAL at 10:12

## 2018-09-10 RX ADMIN — Medication 100 MILLIGRAM(S): at 11:29

## 2018-09-10 RX ADMIN — PANTOPRAZOLE SODIUM 40 MILLIGRAM(S): 20 TABLET, DELAYED RELEASE ORAL at 06:14

## 2018-09-10 RX ADMIN — Medication 4 MILLIGRAM(S): at 11:29

## 2018-09-10 RX ADMIN — ENOXAPARIN SODIUM 40 MILLIGRAM(S): 100 INJECTION SUBCUTANEOUS at 11:29

## 2018-09-10 RX ADMIN — Medication 100 MILLIGRAM(S): at 06:14

## 2018-09-10 RX ADMIN — TRAMADOL HYDROCHLORIDE 50 MILLIGRAM(S): 50 TABLET ORAL at 05:00

## 2018-09-10 RX ADMIN — TRAMADOL HYDROCHLORIDE 50 MILLIGRAM(S): 50 TABLET ORAL at 04:14

## 2018-09-10 NOTE — PROGRESS NOTE ADULT - ASSESSMENT
HPI:  49 yo Portuguese speaking female from [place], reports an injury while at work on 5/23/18 while cleaning a toilet a piece of ceramic broke and a piece hit her on the head knocking her backward into the wall, ever since the accident she's had dizziness and bilateral arm weakness and tingling. CT scan revealed cervical cord stenosis and cord impingement C4-5 and C5-6. Pt. presents to PST today for scheduled C4-6 Anterior Cervical Discectomy and Fusion on 9/7/18. Pt. denies recent fever, chills, chest pain, SOB, changes in bowel/urinary habits. (29 Aug 2018 07:49)    PROCEDURE:  s/p c4-c6 anterior cervical discectomy and fusion- 9/7   POD# 3    PLAN:  1 Out of bed   2 continue physical therapy   3 dvt ppx sql scds   4 decadron taper for dysphagia   5 regular diet   6 stool  softeners   7 discharge patient home     Assessment:  Please Check When Present   []  GCS  E   V  M     Heart Failure: []Acute, [] acute on chronic , []chronic  Heart Failure:  [] Diastolic (HFpEF), [] Systolic (HFrEF), []Combined (HFpEF and HFrEF), [] RHF, [] Pulm HTN, [] Other    [] SONYA, [] ATN, [] AIN, [] other  [] CKD1, [] CKD2, [] CKD 3, [] CKD 4, [] CKD 5, []ESRD    Encephalopathy: [] Metabolic, [] Hepatic, [] toxic, [] Neurological, [] Other    Abnormal Nurtitional Status: [] malnurtition (see nutrition note), [ ]underweight: BMI < 19, [] morbid obesity: BMI >40, [] Cachexia    [] Sepsis  [] hypovolemic shock,[] cardiogenic shock, [] hemorrhagic shock, [] neuogenic shock  [] Acute Respiratory Failure  []Cerebral edema, [] Brain compression/ herniation,   [] Functional quadriplegia  [] Acute blood loss anemia

## 2018-09-10 NOTE — DISCHARGE NOTE ADULT - PLAN OF CARE
Increase activity Follow up with Dr. Montgomery -Neurosurgeon in 7-10 days-Please call office to confirm appointment.   Follow up with PMD in 2 weeks

## 2018-09-10 NOTE — OCCUPATIONAL THERAPY INITIAL EVALUATION ADULT - ANTICIPATED DISCHARGE DISPOSITION, OT EVAL
home w/ outpatient/outpatient OT for strengthening, ROM and coordination deficits RUE , ADLs. Assist with ADLs as needed from family

## 2018-09-10 NOTE — DISCHARGE NOTE ADULT - ADDITIONAL INSTRUCTIONS
May take shower-let water run over the surgical site and pat dry after shower.   If notice any new weakness, numbness, tingling, severe pain, fever, swelling in neck, severe difficulty swallowing or shortness of breath then please contact your physician immediately or come back to the hospital.

## 2018-09-10 NOTE — OCCUPATIONAL THERAPY INITIAL EVALUATION ADULT - DIAGNOSIS, OT EVAL
Pt demonstrates decreased strength/ROM RUE, coordination R hand, sensation, ADLs and functional mobility

## 2018-09-10 NOTE — DISCHARGE NOTE ADULT - CARE PLAN
Principal Discharge DX:	Cervical stenosis of spinal canal  Goal:	Increase activity  Assessment and plan of treatment:	Follow up with Dr. Montgomery -Neurosurgeon in 7-10 days-Please call office to confirm appointment.   Follow up with PMD in 2 weeks

## 2018-09-10 NOTE — PROGRESS NOTE ADULT - SUBJECTIVE AND OBJECTIVE BOX
SUBJECTIVE: Patient was seen and evaluated at bedside. Patient is resting in bed and is in no new acute distress.     OVERNIGHT EVENTS: none     Vital Signs Last 24 Hrs  T(C): 36.6 (10 Sep 2018 09:23), Max: 37.1 (09 Sep 2018 22:14)  T(F): 97.9 (10 Sep 2018 09:23), Max: 98.8 (09 Sep 2018 22:14)  HR: 58 (10 Sep 2018 09:23) (54 - 73)  BP: 114/76 (10 Sep 2018 09:23) (105/62 - 123/77)  BP(mean): --  RR: 16 (10 Sep 2018 09:23) (16 - 18)  SpO2: 98% (10 Sep 2018 09:23) (96% - 98%)    PHYSICAL EXAM:    General: No Acute Distress     Neurological: Awake, alert oriented to person, place and time, Following Commands, PERRL, EOMI, Face Symmetrical, Speech Fluent, Moving all extremities, Muscle Strength normal in all four extremities, No Drift, Sensation to Light Touch Intact    Pulmonary: Clear to Auscultation, No Rales, No Rhonchi, No Wheezes     Cardiovascular: S1, S2, Regular Rate and Rhythm     Gastrointestinal: Soft, Nontender, Nondistended     Incision: clean and dry     LABS:             09-09 @ 07:01  -  09-10 @ 07:00  --------------------------------------------------------  IN: 1775 mL / OUT: 850 mL / NET: 925 mL    09-10 @ 07:01  -  09-10 @ 12:28  --------------------------------------------------------  IN: 700 mL / OUT: 0 mL / NET: 700 mL      DRAINS:     MEDICATIONS:  Antibiotics:  ceFAZolin   IVPB 2000 milliGRAM(s) IV Intermittent once    Neuro:  gabapentin 300 milliGRAM(s) Oral at bedtime  traMADol 25 milliGRAM(s) Oral every 4 hours PRN Moderate Pain (4 - 6)  traMADol 50 milliGRAM(s) Oral every 4 hours PRN Severe Pain (7 - 10)    Cardiac:    Pulm:    GI/:  docusate sodium 100 milliGRAM(s) Oral three times a day  pantoprazole    Tablet 40 milliGRAM(s) Oral before breakfast    Other:   dexamethasone     Tablet 4 milliGRAM(s) Oral every 8 hours  enoxaparin Injectable 40 milliGRAM(s) SubCutaneous daily  influenza   Vaccine 0.5 milliLiter(s) IntraMuscular once  sodium chloride 0.9% with potassium chloride 20 mEq/L 1000 milliLiter(s) IV Continuous <Continuous>    DIET: [] Regular [] CCD [] Renal [] Puree [] Dysphagia [] Tube Feeds: regular     IMAGING: no new imaging today.

## 2018-09-10 NOTE — OCCUPATIONAL THERAPY INITIAL EVALUATION ADULT - LIVES WITH, PROFILE
Pt lives with family in private home with 6 steps to enter, 1st floor setup, tub in bathroom. Pt I in ADLs and ambulation prior to accident, required some assistance for ADLs from daughter after accident

## 2018-09-10 NOTE — DISCHARGE NOTE ADULT - REASON FOR ADMISSION
injury while at work on 5/23/18 while cleaning a toilet a piece of ceramic broke and a piece hit her on the head knocking her backward into the wall, ever since the accident she's had dizziness and bilateral arm weakness and tingling.  patient had a c4-c6 anterior cervical discectomy and fusion done on 9/7

## 2018-09-10 NOTE — DISCHARGE NOTE ADULT - CARE PROVIDER_API CALL
Gonzalez Montgomery (MD), Neurological Surgery  [address] [place] [address] [address]  [place] [address]  Phone: [phone]  Fax: [phone]

## 2018-09-10 NOTE — DISCHARGE NOTE ADULT - NS AS ACTIVITY OBS
Walking-Indoors allowed/Stairs allowed/Walking-Outdoors allowed/No Heavy lifting/straining/Showering allowed/Do not make important decisions/Do not drive or operate machinery

## 2018-09-10 NOTE — DISCHARGE NOTE ADULT - HOSPITAL COURSE
Patient had c4-c6 anterior cervical discectomy and fusion done on 9/7. Post surgery patient was watched in the [facility] under icu care. Patient was moved to floor once stable. Patient was given pain meds, ivf and was started on routine home meds. Patient was also give steroid for dysphagia. Patient was seen and cleared by physical therapy for discharge to home with home physical therapy. Patient was discharged home with follow up instructions.

## 2018-09-10 NOTE — DISCHARGE NOTE ADULT - MEDICATION SUMMARY - MEDICATIONS TO TAKE
I will START or STAY ON the medications listed below when I get home from the hospital:    medrol dosepack   -- dispense #1 dose pack   use as directed   -- Indication: For Steroid     traMADol 50 mg oral tablet  -- 0.5 tab(s) by mouth every 4 hours, As needed, Moderate Pain (4 - 6)  -- Indication: For pain    traMADol 50 mg oral tablet  -- 1 tab(s) by mouth every 4 hours, As needed, Severe Pain (7 - 10) or take half tablet for mild pain. MDD:6  -- Indication: For pain    gabapentin 300 mg oral capsule  -- 1 cap(s) by mouth once a day (at bedtime)  -- Indication: For pain    docusate sodium 100 mg oral capsule  -- 1 cap(s) by mouth 3 times a day  -- Indication: For Stool softener     pantoprazole 40 mg oral delayed release tablet  -- 1 tab(s) by mouth once a day (before a meal)   till steroid is over   -- Indication: For Stomach upset I will START or STAY ON the medications listed below when I get home from the hospital:    medrol dosepack   -- dispense #1 dose pack   use as directed   -- Indication: For Steroid     Outpatient PT/OT   -- 3 times a week for 4 weeks   start after visit with neurosurgeon.  diagnosis- s/p anterior cervical discectomy and fusion/posterior  -- Indication: For physical therapy     traMADol 50 mg oral tablet  -- 0.5 tab(s) by mouth every 4 hours, As needed, Moderate Pain (4 - 6)  -- Indication: For pain    traMADol 50 mg oral tablet  -- 1 tab(s) by mouth every 4 hours, As needed, Severe Pain (7 - 10) or take half tablet for mild pain. MDD:6  -- Indication: For pain    gabapentin 300 mg oral capsule  -- 1 cap(s) by mouth once a day (at bedtime)  -- Indication: For pain    docusate sodium 100 mg oral capsule  -- 1 cap(s) by mouth 3 times a day  -- Indication: For Stool softener     pantoprazole 40 mg oral delayed release tablet  -- 1 tab(s) by mouth once a day (before a meal)   till steroid is over   -- Indication: For Stomach upset

## 2018-09-10 NOTE — DISCHARGE NOTE ADULT - PATIENT PORTAL LINK FT
You can access the [facility] Patient Portal, offered by [facility], by registering with the following website: [email or web address]

## 2018-09-14 PROCEDURE — 97110 THERAPEUTIC EXERCISES: CPT

## 2018-09-14 PROCEDURE — 85027 COMPLETE CBC AUTOMATED: CPT

## 2018-09-14 PROCEDURE — 82962 GLUCOSE BLOOD TEST: CPT

## 2018-09-14 PROCEDURE — 97161 PT EVAL LOW COMPLEX 20 MIN: CPT

## 2018-09-14 PROCEDURE — C1889: CPT

## 2018-09-14 PROCEDURE — 86900 BLOOD TYPING SEROLOGIC ABO: CPT

## 2018-09-14 PROCEDURE — 76000 FLUOROSCOPY <1 HR PHYS/QHP: CPT

## 2018-09-14 PROCEDURE — 86901 BLOOD TYPING SEROLOGIC RH(D): CPT

## 2018-09-14 PROCEDURE — 97165 OT EVAL LOW COMPLEX 30 MIN: CPT

## 2018-09-14 PROCEDURE — 97116 GAIT TRAINING THERAPY: CPT

## 2018-09-14 PROCEDURE — 80048 BASIC METABOLIC PNL TOTAL CA: CPT

## 2018-09-18 ENCOUNTER — OTHER (OUTPATIENT)
Age: 48
End: 2018-09-18

## 2018-09-20 ENCOUNTER — APPOINTMENT (OUTPATIENT)
Dept: SPINE | Facility: CLINIC | Age: 48
End: 2018-09-20
Payer: COMMERCIAL

## 2018-09-20 VITALS
BODY MASS INDEX: 27.64 KG/M2 | WEIGHT: 156 LBS | HEIGHT: 63 IN | SYSTOLIC BLOOD PRESSURE: 124 MMHG | DIASTOLIC BLOOD PRESSURE: 76 MMHG

## 2018-09-20 PROCEDURE — 99024 POSTOP FOLLOW-UP VISIT: CPT

## 2018-10-04 ENCOUNTER — APPOINTMENT (OUTPATIENT)
Dept: SPINE | Facility: CLINIC | Age: 48
End: 2018-10-04
Payer: OTHER MISCELLANEOUS

## 2018-10-04 VITALS
BODY MASS INDEX: 27.64 KG/M2 | HEIGHT: 63 IN | WEIGHT: 156 LBS | DIASTOLIC BLOOD PRESSURE: 70 MMHG | SYSTOLIC BLOOD PRESSURE: 118 MMHG

## 2018-10-04 DIAGNOSIS — Z86.018 PERSONAL HISTORY OF OTHER BENIGN NEOPLASM: ICD-10-CM

## 2018-10-04 PROCEDURE — 99024 POSTOP FOLLOW-UP VISIT: CPT

## 2018-11-15 ENCOUNTER — APPOINTMENT (OUTPATIENT)
Dept: SPINE | Facility: CLINIC | Age: 48
End: 2018-11-15
Payer: OTHER MISCELLANEOUS

## 2018-11-15 VITALS
WEIGHT: 156 LBS | DIASTOLIC BLOOD PRESSURE: 76 MMHG | BODY MASS INDEX: 27.64 KG/M2 | HEIGHT: 63 IN | SYSTOLIC BLOOD PRESSURE: 122 MMHG

## 2018-11-15 PROCEDURE — 99024 POSTOP FOLLOW-UP VISIT: CPT

## 2018-11-28 ENCOUNTER — INBOUND DOCUMENT (OUTPATIENT)
Age: 48
End: 2018-11-28

## 2018-11-30 ENCOUNTER — OTHER (OUTPATIENT)
Age: 48
End: 2018-11-30

## 2019-01-17 ENCOUNTER — APPOINTMENT (OUTPATIENT)
Dept: SPINE | Facility: CLINIC | Age: 49
End: 2019-01-17
Payer: OTHER MISCELLANEOUS

## 2019-01-17 VITALS
WEIGHT: 160 LBS | BODY MASS INDEX: 28.35 KG/M2 | HEIGHT: 63 IN | DIASTOLIC BLOOD PRESSURE: 74 MMHG | SYSTOLIC BLOOD PRESSURE: 124 MMHG

## 2019-01-17 PROCEDURE — 99213 OFFICE O/P EST LOW 20 MIN: CPT

## 2019-01-17 RX ORDER — CYCLOBENZAPRINE HYDROCHLORIDE 5 MG/1
5 TABLET, FILM COATED ORAL 3 TIMES DAILY
Qty: 45 | Refills: 0 | Status: DISCONTINUED | COMMUNITY
Start: 2018-09-20 | End: 2019-01-17

## 2019-01-17 NOTE — REASON FOR VISIT
[Follow-Up: _____] : a [unfilled] follow-up visit [FreeTextEntry1] : Recent x-rays showed no change in hardware alignment and progression of interbody arthrodesis. She is improving with physical therapy. She is using her bone stimulator.Continues describe right scapular pain along with right arm and hand pain.

## 2019-01-17 NOTE — ASSESSMENT
[FreeTextEntry1] : continue physical therapy and return with new x-rays in 4 months.On requesting physical and occupational therapy authorization.

## 2019-01-17 NOTE — PHYSICAL EXAM
[Well-Healed] : well-healed [Motor Strength] : muscle strength was normal in all four extremities [Abnormal Walk] : normal gait

## 2019-01-28 ENCOUNTER — APPOINTMENT (OUTPATIENT)
Dept: FAMILY MEDICINE | Facility: CLINIC | Age: 49
End: 2019-01-28
Payer: MEDICAID

## 2019-01-28 VITALS
SYSTOLIC BLOOD PRESSURE: 98 MMHG | WEIGHT: 160 LBS | DIASTOLIC BLOOD PRESSURE: 60 MMHG | OXYGEN SATURATION: 99 % | BODY MASS INDEX: 28.71 KG/M2 | HEART RATE: 84 BPM | HEIGHT: 62.5 IN | RESPIRATION RATE: 16 BRPM

## 2019-01-28 DIAGNOSIS — M25.519 PAIN IN UNSPECIFIED SHOULDER: ICD-10-CM

## 2019-01-28 LAB
BASOPHILS # BLD AUTO: 0.01 K/UL
BASOPHILS NFR BLD AUTO: 0.2 %
EOSINOPHIL # BLD AUTO: 0.07 K/UL
EOSINOPHIL NFR BLD AUTO: 1.5 %
HCT VFR BLD CALC: 40.6 %
HGB BLD-MCNC: 13.5 G/DL
IMM GRANULOCYTES NFR BLD AUTO: 0 %
LYMPHOCYTES # BLD AUTO: 1.78 K/UL
LYMPHOCYTES NFR BLD AUTO: 38 %
MAN DIFF?: NORMAL
MCHC RBC-ENTMCNC: 29.9 PG
MCHC RBC-ENTMCNC: 33.3 GM/DL
MCV RBC AUTO: 89.8 FL
MONOCYTES # BLD AUTO: 0.34 K/UL
MONOCYTES NFR BLD AUTO: 7.2 %
NEUTROPHILS # BLD AUTO: 2.49 K/UL
NEUTROPHILS NFR BLD AUTO: 53.1 %
PLATELET # BLD AUTO: 149 K/UL
RBC # BLD: 4.52 M/UL
RBC # FLD: 13.2 %
WBC # FLD AUTO: 4.69 K/UL

## 2019-01-28 PROCEDURE — 99396 PREV VISIT EST AGE 40-64: CPT | Mod: 25

## 2019-01-28 PROCEDURE — 36415 COLL VENOUS BLD VENIPUNCTURE: CPT

## 2019-01-28 PROCEDURE — 90674 CCIIV4 VAC NO PRSV 0.5 ML IM: CPT

## 2019-01-28 PROCEDURE — G0008: CPT

## 2019-01-28 NOTE — HISTORY OF PRESENT ILLNESS
[FreeTextEntry1] : c/o persistent rt arm/ neck pain though slightly improving w/ PT\par s/p cervical spine surgery\par c/o depression as she is unable to work and pay the bills, staying home, crying, gets easily frustrated\par father is bed bound, lives in [facility], unable to help\par  had recent surgery\par [place] speaking, from [facility] [de-identified] : 48 year old female who presents today for a complete physical exam.\par

## 2019-01-28 NOTE — COUNSELING
[Weight management counseling provided] : Weight management [Healthy eating counseling provided] : healthy eating [Activity counseling provided] : activity [Low Fat Diet] : Low fat diet [Walking] : Walking [Good understanding] : Patient has a good understanding of lifestyle changes and the steps needed to achieve self management goals [de-identified] : Allowed to vent, emotional support provided

## 2019-01-28 NOTE — PHYSICAL EXAM
[No Acute Distress] : no acute distress [Well Nourished] : well nourished [Well Developed] : well developed [Well-Appearing] : well-appearing [Normal Sclera/Conjunctiva] : normal sclera/conjunctiva [PERRL] : pupils equal round and reactive to light [EOMI] : extraocular movements intact [Normal Outer Ear/Nose] : the outer ears and nose were normal in appearance [Normal Oropharynx] : the oropharynx was normal [No JVD] : no jugular venous distention [Supple] : supple [No Lymphadenopathy] : no lymphadenopathy [Thyroid Normal, No Nodules] : the thyroid was normal and there were no nodules present [No Respiratory Distress] : no respiratory distress  [Clear to Auscultation] : lungs were clear to auscultation bilaterally [No Accessory Muscle Use] : no accessory muscle use [Normal Rate] : normal rate  [Regular Rhythm] : with a regular rhythm [Normal S1, S2] : normal S1 and S2 [No Murmur] : no murmur heard [No Abdominal Bruit] : a ~M bruit was not heard ~T in the abdomen [No Varicosities] : no varicosities [Pedal Pulses Present] : the pedal pulses are present [No Edema] : there was no peripheral edema [No Extremity Clubbing/Cyanosis] : no extremity clubbing/cyanosis [No Palpable Aorta] : no palpable aorta [Normal Appearance] : normal in appearance [No Nipple Discharge] : no nipple discharge [No Axillary Lymphadenopathy] : no axillary lymphadenopathy [Soft] : abdomen soft [Non Tender] : non-tender [Non-distended] : non-distended [No Masses] : no abdominal mass palpated [No HSM] : no HSM [Normal Bowel Sounds] : normal bowel sounds [Normal Supraclavicular Nodes] : no supraclavicular lymphadenopathy [Normal Axillary Nodes] : no axillary lymphadenopathy [Normal Posterior Cervical Nodes] : no posterior cervical lymphadenopathy [Normal Anterior Cervical Nodes] : no anterior cervical lymphadenopathy [No CVA Tenderness] : no CVA  tenderness [No Spinal Tenderness] : no spinal tenderness [No Joint Swelling] : no joint swelling [Grossly Normal Strength/Tone] : grossly normal strength/tone [No Rash] : no rash [Normal Gait] : normal gait [Coordination Grossly Intact] : coordination grossly intact [No Focal Deficits] : no focal deficits [Deep Tendon Reflexes (DTR)] : deep tendon reflexes were 2+ and symmetric [Normal Affect] : the affect was normal [Normal Insight/Judgement] : insight and judgment were intact [de-identified] : injected nasal turbinates [de-identified] : unable to hold rt arm up for prolonged period [de-identified] : reduced rt hand  and coordination

## 2019-01-28 NOTE — HEALTH RISK ASSESSMENT
[Fair] :  ~his/her~ mood as fair [Any fall with injury in past year] : Patient reported fall with injury in the past year [2] : 1) Little interest or pleasure doing things for more than half of the days (2) [3] : 2) Feeling down, depressed, or hopeless for nearly every day (3) [Patient reported mammogram was normal] : Patient reported mammogram was normal [With Significant Other] : lives with significant other [With Family] : lives with family [# of Members in Household ___] :  household currently consist of [unfilled] member(s) [On disability] : on disability [Significant Other] : lives with significant other [Sexually Active] : sexually active [Feels Safe at Home] : Feels safe at home [] : No [Change in mental status noted] : No change in mental status noted [Language] : denies difficulty with language [Handling Complex Tasks] : denies difficulty handling complex tasks [MammogramDate] : 08/18

## 2019-01-30 LAB
ALBUMIN SERPL ELPH-MCNC: 4.4 G/DL
ALP BLD-CCNC: 88 U/L
ALT SERPL-CCNC: 38 U/L
ANION GAP SERPL CALC-SCNC: 10 MMOL/L
APPEARANCE: CLEAR
AST SERPL-CCNC: 33 U/L
BACTERIA: NEGATIVE
BILIRUB SERPL-MCNC: 0.6 MG/DL
BILIRUBIN URINE: NEGATIVE
BLOOD URINE: ABNORMAL
BUN SERPL-MCNC: 11 MG/DL
CALCIUM SERPL-MCNC: 9.6 MG/DL
CHLORIDE SERPL-SCNC: 104 MMOL/L
CHOLEST SERPL-MCNC: 270 MG/DL
CHOLEST/HDLC SERPL: 3.5 RATIO
CO2 SERPL-SCNC: 30 MMOL/L
COLOR: YELLOW
CREAT SERPL-MCNC: 0.78 MG/DL
GLUCOSE QUALITATIVE U: NEGATIVE MG/DL
GLUCOSE SERPL-MCNC: 91 MG/DL
HDLC SERPL-MCNC: 77 MG/DL
HYALINE CASTS: 1 /LPF
KETONES URINE: NEGATIVE
LDLC SERPL CALC-MCNC: 173 MG/DL
LEUKOCYTE ESTERASE URINE: NEGATIVE
MICROSCOPIC-UA: NORMAL
NITRITE URINE: NEGATIVE
PH URINE: 6.5
POTASSIUM SERPL-SCNC: 3.9 MMOL/L
PROT SERPL-MCNC: 7 G/DL
PROTEIN URINE: NEGATIVE MG/DL
RED BLOOD CELLS URINE: 3 /HPF
SODIUM SERPL-SCNC: 144 MMOL/L
SPECIFIC GRAVITY URINE: 1.01
SQUAMOUS EPITHELIAL CELLS: 2 /HPF
TRIGL SERPL-MCNC: 99 MG/DL
TSH SERPL-ACNC: 2.12 UIU/ML
UROBILINOGEN URINE: NEGATIVE MG/DL
WHITE BLOOD CELLS URINE: 1 /HPF

## 2019-02-01 ENCOUNTER — OUTPATIENT (OUTPATIENT)
Dept: OUTPATIENT SERVICES | Facility: HOSPITAL | Age: 49
LOS: 1 days | End: 2019-02-01

## 2019-02-01 DIAGNOSIS — Z98.51 TUBAL LIGATION STATUS: Chronic | ICD-10-CM

## 2019-02-01 DIAGNOSIS — Z86.018 PERSONAL HISTORY OF OTHER BENIGN NEOPLASM: Chronic | ICD-10-CM

## 2019-02-01 DIAGNOSIS — Z90.710 ACQUIRED ABSENCE OF BOTH CERVIX AND UTERUS: Chronic | ICD-10-CM

## 2019-02-01 DIAGNOSIS — Z98.890 OTHER SPECIFIED POSTPROCEDURAL STATES: Chronic | ICD-10-CM

## 2019-02-12 DIAGNOSIS — Z71.89 OTHER SPECIFIED COUNSELING: ICD-10-CM

## 2019-02-25 ENCOUNTER — APPOINTMENT (OUTPATIENT)
Dept: FAMILY MEDICINE | Facility: CLINIC | Age: 49
End: 2019-02-25
Payer: MEDICAID

## 2019-02-25 VITALS
HEART RATE: 64 BPM | OXYGEN SATURATION: 98 % | WEIGHT: 161 LBS | RESPIRATION RATE: 16 BRPM | SYSTOLIC BLOOD PRESSURE: 108 MMHG | BODY MASS INDEX: 28.89 KG/M2 | HEIGHT: 62.5 IN | DIASTOLIC BLOOD PRESSURE: 72 MMHG

## 2019-02-25 DIAGNOSIS — B35.9 DERMATOPHYTOSIS, UNSPECIFIED: ICD-10-CM

## 2019-02-25 PROCEDURE — 99214 OFFICE O/P EST MOD 30 MIN: CPT

## 2019-02-25 NOTE — COUNSELING
[Healthy eating counseling provided] : healthy eating [Low Fat Diet] : Low fat diet [Walking] : Walking [Good understanding] : Patient has a good understanding of lifestyle changes and the steps needed to achieve self management goals

## 2019-02-25 NOTE — HISTORY OF PRESENT ILLNESS
[FreeTextEntry1] : c/o fungus to all fingers and toes w/ itchiness [de-identified] : wants to review bw\par needs to repeat ua, denies menses or sexual activity\par states since starting duloxetine, is less angry, but still depressed\par [place] speaking\par has appt w/ Dr. Montgomery this wk, c/o rt arm weakness\par

## 2019-02-25 NOTE — PHYSICAL EXAM
[No Acute Distress] : no acute distress [Well Nourished] : well nourished [Normal Sclera/Conjunctiva] : normal sclera/conjunctiva [EOMI] : extraocular movements intact [Normal Outer Ear/Nose] : the outer ears and nose were normal in appearance [No JVD] : no jugular venous distention [No Respiratory Distress] : no respiratory distress  [No Accessory Muscle Use] : no accessory muscle use [Normal Gait] : normal gait [Normal Affect] : the affect was normal [Alert and Oriented x3] : oriented to person, place, and time [Normal Insight/Judgement] : insight and judgment were intact [de-identified] : mildly thickened toenails. fingernails cut short [de-identified] : ?reduced strength to RUE [de-identified] : in better spirits

## 2019-02-27 LAB
APPEARANCE: CLEAR
BILIRUBIN URINE: NEGATIVE
BLOOD URINE: NEGATIVE
COLOR: NORMAL
GLUCOSE QUALITATIVE U: NEGATIVE
KETONES URINE: NEGATIVE
LEUKOCYTE ESTERASE URINE: NEGATIVE
NITRITE URINE: NEGATIVE
PH URINE: 7
PROTEIN URINE: NEGATIVE
SPECIFIC GRAVITY URINE: 1.01
UROBILINOGEN URINE: NORMAL

## 2019-02-28 ENCOUNTER — APPOINTMENT (OUTPATIENT)
Dept: SPINE | Facility: CLINIC | Age: 49
End: 2019-02-28
Payer: OTHER MISCELLANEOUS

## 2019-02-28 VITALS
BODY MASS INDEX: 28.89 KG/M2 | SYSTOLIC BLOOD PRESSURE: 114 MMHG | WEIGHT: 161 LBS | DIASTOLIC BLOOD PRESSURE: 74 MMHG | HEIGHT: 62.5 IN

## 2019-02-28 PROCEDURE — 99213 OFFICE O/P EST LOW 20 MIN: CPT

## 2019-02-28 NOTE — REASON FOR VISIT
[Follow-Up: _____] : a [unfilled] follow-up visit [FreeTextEntry1] : 5 months status post a 2 level ACDF in complaining of right shoulder and arm pain along with lower back pain and radiation to the right lower admitting. She is seeing an orthopedic shoulder specialist. She has not had any recent cervical spine x-rays.

## 2019-02-28 NOTE — PHYSICAL EXAM
[Well-Healed] : well-healed [Abnormal Walk] : normal gait [FreeTextEntry6] : mild right arm and  weakness with questionable exertion [Straight-Leg Raise Test - Left] : straight leg raise of the left leg was negative [Straight-Leg Raise Test - Right] : straight leg raise  of the right leg was negative

## 2019-02-28 NOTE — ASSESSMENT
[FreeTextEntry1] : Requesting authorization for an MRI of the lumbar spine and also physical therapy for lower back. Range followup cervical spine x-rays.

## 2019-03-14 ENCOUNTER — APPOINTMENT (OUTPATIENT)
Dept: SPINE | Facility: CLINIC | Age: 49
End: 2019-03-14
Payer: OTHER MISCELLANEOUS

## 2019-03-14 VITALS
SYSTOLIC BLOOD PRESSURE: 118 MMHG | WEIGHT: 161 LBS | DIASTOLIC BLOOD PRESSURE: 70 MMHG | HEIGHT: 62.5 IN | BODY MASS INDEX: 28.89 KG/M2

## 2019-03-14 DIAGNOSIS — Z78.9 OTHER SPECIFIED HEALTH STATUS: ICD-10-CM

## 2019-03-14 PROCEDURE — 99213 OFFICE O/P EST LOW 20 MIN: CPT

## 2019-03-14 NOTE — ASSESSMENT
[FreeTextEntry1] : I will recommend a course of physical therapy for her lumbar spine and I would like to have her return in 3 months with followup cervical spine x-rays in flexion and extension.

## 2019-03-14 NOTE — REVIEW OF SYSTEMS
[Negative] : Heme/Lymph [de-identified] : neck pain and right sided shoulder pain, difficulty with fine coordination \par lower back pain and right sided radiculopathy into knee [FreeTextEntry8] : No incontienece

## 2019-03-14 NOTE — DATA REVIEWED
[de-identified] : MRI of lower spine 3/11/2019 from [facility] [de-identified] : X rays of cervical spine from  3/11/2019

## 2019-03-14 NOTE — REASON FOR VISIT
[Follow-Up: _____] : a [unfilled] follow-up visit [FreeTextEntry1] : Recent x-rays of the cervical spine show no change in the hardware or alignment and progression of interbody arthrodesis. MRI of the lumbar spine shows mild noncompressive degenerative changes. She has plans to see an orthopedic specialist in the near future concerning her shoulder.

## 2019-03-18 ENCOUNTER — APPOINTMENT (OUTPATIENT)
Dept: ORTHOPEDIC SURGERY | Facility: CLINIC | Age: 49
End: 2019-03-18
Payer: OTHER MISCELLANEOUS

## 2019-03-18 PROCEDURE — 99203 OFFICE O/P NEW LOW 30 MIN: CPT

## 2019-03-18 NOTE — ADDENDUM
[FreeTextEntry1] : I, Ximena Campuzano, acted solely as a scribe for Dr. Stuart Zavala on this date 03/18/2019.

## 2019-03-18 NOTE — PHYSICAL EXAM
[Normal RUE] : Right Upper Extremity: No scars, rashes, lesions, ulcers, skin intact [Normal LUE] : Left Upper Extremity: No scars, rashes, lesions, ulcers, skin intact [Normal Touch] : sensation intact for touch [Normal] : No swelling, no edema, normal pedal pulses and normal temperature [Obese] : obese [Poor Appearance] : well-appearing [Acute Distress] : not in acute distress [de-identified] : Cervical Spine/Neck\par Inspection/Palpation :\par ¦ Inspection : alignment midline, normal degree of lordosis present\par ¦ Skin : normal appearance, no masses, no tenderness, trachea midline\par ¦ Palpation : tenderness along the paraspinal musculature bilaterally\par ¦ Tests and Signs : Spurling’s (-), Lhermitte’s (-)\par ¦ Range of Motion : Moderately limited flexion and extension, Right rotation: 45 degrees with pain, Left rotation: 45 degrees with pain\par ¦  Stability : no subluxations or other evidence of instability demonstrated during range of motion testing\par o Muscle Strength : paraspinal muscle strength within normal limits\par o Muscle Tone : paraspinal muscle tone within normal limits\par o Muscle Bulk : normal, no atrophy\par o Cervical Lymph Nodes : no lymphadenopathy present \par Right Upper Extremity\par o Shoulder :\par ¦ Inspection/Palpation : tenderness over the greater tuberosity, no swelling, no deformities\par ¦ Range of Motion : ACTIVE FORWARD ELEVATION: Measured at 115 degrees, ACTIVE EXTERNAL ROTATION: Measured at 45 degrees, ACTIVE INTERNAL ROTATION: Measured at T10, ACTIVE ABDUCTION ROTATION: Measured at 90 degrees\par ¦ Strength : external rotation 4+/5, internal rotation 4+/5, supraspinatus 5/5 \par ¦ Upper Extremity Strength : all intrinsic and extrinsic hand muscles 3/5 \par ¦ Stability : no joint instability on provocative testing\par ¦ Tests/Signs : Neer (+), Bridges (+)\par o Upper Arm : no tenderness, no swelling, no deformities\par o Muscle Bulk : no atrophy \par o Sensation : sensation intact to light touch \par o Skin : no skin rash or discoloration \par o Vascular Exam : no edema, no cyanosis, radial and ulnar pulses normal \par  \par Left Upper Extremity\par o Shoulder : \par ¦ Inspection/Palpation : no tenderness, no swelling, no deformities\par ¦ Range of Motion : ACTIVE FORWARD ELEVATION: Measured at 150 degrees, ACTIVE EXTERNAL ROTATION: Measured at 45 degrees, ACTIVE INTERNAL ROTATION: Measured at L5, ACTIVE ABDUCTION ROTATION: Measured at  130 degrees\par ¦ Strength : external rotation 5/5, internal rotation 5/5, supraspinatus 5/5 \par ¦ Upper Extremity Strength : all intrinsic and extrinsic hand muscles 3/5 \par ¦ Stability : no joint instability on provocative testing\par o Upper Arm : no tenderness, no swelling, no deformities\par o Muscle Bulk : no atrophy\par o Sensation : sensation intact to light touch\par o Skin : no skin rash or discoloration\par o Vascular Exam : no edema, no cyanosis, radial and ulnar pulses normal \par \par Lumbosacral Spine:\par Musculoskeletal Examination \par ¦  Inspection : no visible rash, no deformities\par ¦  Palpation : paraspinal musculature nontender\par ¦  Stability : no subluxations present\par ¦  Range of Motion : full arc of motion in all planes\par ¦  Muscle Strength : paraspinal muscle strength and tone within normal limits\par ¦  Strength : hip flexion 5/5\par ¦  Muscle Tone : paraspinal muscle strength and tone within normal limits\par ¦  Tests/Signs : Straight Leg Raise Test (+) bilaterally, right worse than left. Patellar and Achilles Reflexes (2+) bilaterally.  [de-identified] : o XRays of the right shoulder were obtained at [facility] on 3/11/2019, revealed:\par Unremarkable findings, no acute fractures. \par \par o XRays of the cervical spine  were obtained at [facility] on 3/11/2019, revealed:\par s/p discectomy and discoplasty of C4-5 and C5-6, no acute fractures, degenerative disc disease throughout the cervical spine\par \par MRI of the lumbar spine was obtained at [facility] on 3/11/2019, revealed:\par 1. Left foraminal disc herniation/vertebral spurring at L4-L5 with left foraminal encroachment and impingement of the exiting left L4 nerve root.\par 2. Annular bulge/vertebral spurring at L5-S1 resulting in a thecal sac impingement and bilateral foraminal narrowing with bilateral L5 nerve root impingement.\par 3. Intraosseous discal herniations at T11-T12 and L1-L2.\par 4. Annular bulge at T11-T12 which flattens the ventral thecal sac.\par 5. Injury of the interspinous ligament at L5-S1. \par

## 2019-03-18 NOTE — HISTORY OF PRESENT ILLNESS
[de-identified] : Worker’s Compensation Visit:\par 48 year old female presents for an evaluation of right shoulder pain and neck pain. The patient presents with an injury to the right shoulder, neck, and right hand that occurred on 5/25/2018. The patient works as a  at a school and was injured at work while cleaning the toilet when it exploded when she flushed it. After the injury she was taken to the Hospital and had XRays taken in the ED. She is s/p discectomy and discoplasty of C4-5 and C5-6 performed on 9/7/2018. \par Today she rates her pain a 6/10 and describes it as a constant pain to her spine and right shoulder. She describes pain with rotation of her neck and weakness about her shoulder resulting in limited ROM. She also states that she has been experiencing weakness about her right shoulder and hand making it difficult for her to  or hold objects. \par \par Test or Referrals: Authorization for physical therapy of the right shoulder, neck, and lumbar spine. \par Work Status: Pt is currently not working, 100% disabled\par Prognosis:The patients prognosis for recovery is guarded. It is recommended that they return for a follow-up appointment in 6-8 weeks.

## 2019-03-18 NOTE — END OF VISIT
[FreeTextEntry3] : All medical record entries made by the Meganibe were at my, Dr. Stuart Zavala, direction and personally dictated by me on 03/18/2019. I have reviewed the chart and agree that the record accurately reflects my personal performance of the history, physical exam, assessment and plan. I have also personally directed, reviewed, and agreed with the chart.

## 2019-03-18 NOTE — CONSULT LETTER
[Dear  ___] : Dear  [unfilled], [Consult Letter:] : I had the pleasure of evaluating your patient, [unfilled]. [Please see my note below.] : Please see my note below. [Consult Closing:] : Thank you very much for allowing me to participate in the care of this patient.  If you have any questions, please do not hesitate to contact me. [Sincerely,] : Sincerely, [FreeTextEntry2] : DARIUS OCASIO [FreeTextEntry3] :  Dr. Stuart Zavala

## 2019-03-18 NOTE — REASON FOR VISIT
[Initial Visit] : an initial visit for [Shoulder Pain] : shoulder pain [Worker's Compensation] : This visit is related to worker's compensation

## 2019-03-18 NOTE — DISCUSSION/SUMMARY
[de-identified] : The underlying pathophysiology was reviewed in great detail with the patient as well as the various treatment options, including ice, analgesics, NSAIDs, Physical therapy, steroid injections.\par \par I recommend that she follow up with a neurologist, a referral was provided. \par \par A prescription for Physical Therapy was provided. A request was sent to workman's compensation.\par  \par FU 6-8 weeks.

## 2019-04-11 ENCOUNTER — APPOINTMENT (OUTPATIENT)
Dept: SPINE | Facility: CLINIC | Age: 49
End: 2019-04-11
Payer: OTHER MISCELLANEOUS

## 2019-04-11 VITALS
HEIGHT: 62.5 IN | WEIGHT: 161 LBS | BODY MASS INDEX: 28.89 KG/M2 | DIASTOLIC BLOOD PRESSURE: 76 MMHG | SYSTOLIC BLOOD PRESSURE: 124 MMHG

## 2019-04-11 DIAGNOSIS — M48.00 SPINAL STENOSIS, SITE UNSPECIFIED: ICD-10-CM

## 2019-04-11 PROCEDURE — 99213 OFFICE O/P EST LOW 20 MIN: CPT

## 2019-04-11 NOTE — REASON FOR VISIT
[Follow-Up: _____] : a [unfilled] follow-up visit [Family Member] : family member [FreeTextEntry1] : 7 months s/p C4-5 ACDF

## 2019-05-23 ENCOUNTER — APPOINTMENT (OUTPATIENT)
Dept: SPINE | Facility: CLINIC | Age: 49
End: 2019-05-23
Payer: MEDICAID

## 2019-05-23 VITALS
BODY MASS INDEX: 28.89 KG/M2 | SYSTOLIC BLOOD PRESSURE: 124 MMHG | HEIGHT: 62.5 IN | DIASTOLIC BLOOD PRESSURE: 84 MMHG | WEIGHT: 161 LBS

## 2019-05-23 PROCEDURE — 99213 OFFICE O/P EST LOW 20 MIN: CPT

## 2019-05-23 NOTE — ASSESSMENT
[FreeTextEntry1] : Multiple somatic pain complaints would best be addressed with a pain management specialist. No need for any further surgical intervention at the present time. Recommend followup cervical spine x-rays in flexion and extension in 4 months.

## 2019-05-23 NOTE — REASON FOR VISIT
[Follow-Up: _____] : a [unfilled] follow-up visit [FreeTextEntry1] : Patient continues to describe headache, right shoulder and arm pain and right leg pain. Recent EMG testing was suggestive of right L5 radiculopathy however MRI testing did not show any neural impingement. Recent cervical spine x-rays show no change in hardware or alignment and progression of interbody arthrodesis. Discussions with the patient on this date we've done with the aid of an .

## 2019-05-31 ENCOUNTER — APPOINTMENT (OUTPATIENT)
Dept: ORTHOPEDIC SURGERY | Facility: CLINIC | Age: 49
End: 2019-05-31
Payer: OTHER MISCELLANEOUS

## 2019-05-31 VITALS — WEIGHT: 161 LBS | BODY MASS INDEX: 28.89 KG/M2 | HEIGHT: 62.5 IN

## 2019-05-31 PROCEDURE — 99214 OFFICE O/P EST MOD 30 MIN: CPT

## 2019-05-31 NOTE — END OF VISIT
[FreeTextEntry3] : All medical record entries made by the Meganibmonica were at my, Dr. Stuart Zavala, direction and personally dictated by me on 05/31/2019. I have reviewed the chart and agree that the record accurately reflects my personal performance of the history, physical exam, assessment and plan. I have also personally directed, reviewed, and agreed with the chart.

## 2019-05-31 NOTE — HISTORY OF PRESENT ILLNESS
[de-identified] : Worker’s Compensation Visit:\par 49 year old female presents for an evaluation of right shoulder pain and neck pain. The patient presents with an injury to the right shoulder, neck, and right hand that occurred on 5/25/2018. The patient works as a  at a school and was injured at work while cleaning the toilet when it exploded when she flushed it. After the injury she was taken to the Hospital and had XRays taken in the ED. On 6/2/2018 an MRI of the cervical spine was obtained and revealed progressive multilevel degenerative spondylitic change with asymmetries and cord compression. On 9/7/2018 she underwent a discectomy and discoplasty of C4-5 and C5-6. On 3/11/2019 XRays of the right shoulder were obtained an revealed unremarkable findings, no acute fractures. On 3/11/2019 XRays of the cervical spine were obtained and revealed s/p discectomy and discoplasty of C4-5 and C5-6, no acute fractures, degenerative disc disease throughout the cervical spine.On 3/11/2019 an MRI of the lumbar spine was obtained and revealed left foraminal disc herniation/vertebral spurring at L4-L5 with left foraminal encroachment and impingement of the exiting left L4 nerve root, annular bulge/vertebral spurring at L5-S1 resulting in a thecal sac impingement and bilateral foraminal narrowing with bilateral L5 nerve root impingement, intraosseous discal herniations at T11-T12 and L1-L2, annular bulge at T11-T12 which flattens the ventral thecal sac, injury of the interspinous ligament at L5-S1. \par \par Since her last visit: She has yet to attend physical therapy since she is still waiting for approvable. She says that she has continued to experience pain along her cervical and lumbar spine that is exacerbated with rotation and flexion of her neck and rotation and flexion of her lower back. The patient also reports that her shoulder pain has been persistent and she describes a constant aching pain located along the anterior aspect of her right shoulder as well as weakness of her shoulder that is limiting her ROM. \par She has been following with \par \par Test or Referrals: Authorization for physical therapy of the right shoulder, neck, and lumbar spine. Authorization for an MRI of the right shoulder to rule out rotator cuff tear.\par Work Status: Pt is currently not working, 100% disabled\par Prognosis:The patients prognosis for recovery is guarded. It is recommended that they return for a follow-up appointment in 6-8 weeks.

## 2019-05-31 NOTE — REASON FOR VISIT
[Follow-Up Visit] : a follow-up visit for [Worker's Compensation] : This visit is related to worker's compensation [Shoulder Pain] : shoulder pain

## 2019-05-31 NOTE — DISCUSSION/SUMMARY
[de-identified] : The underlying pathophysiology was reviewed in great detail with the patient as well as the various treatment options, including ice, analgesics, NSAIDs, Physical therapy, steroid injections.\par \par A prescription for Physical Therapy was provided.\par  \par An MRI of the right shoulder was ordered to rule out rotator cuff tear.\par \par Requests were sent to worker's compensation. \par \par FU after imaging is obtained.

## 2019-05-31 NOTE — PHYSICAL EXAM
[Normal LUE] : Left Upper Extremity: No scars, rashes, lesions, ulcers, skin intact [Normal Touch] : sensation intact for touch [Normal] : No swelling, no edema, normal pedal pulses and normal temperature [Normal RUE] : Right Upper Extremity: No scars, rashes, lesions, ulcers, skin intact [Normal RLE] : Right Lower Extremity: No scars, rashes, lesions, ulcers, skin intact [Normal LLE] : Left Lower Extremity: No scars, rashes, lesions, ulcers, skin intact [Poor Appearance] : well-appearing [Acute Distress] : not in acute distress [de-identified] : Cervical Spine/Neck\par Inspection/Palpation :\par ¦ Inspection : alignment midline, normal degree of lordosis present\par ¦ Skin : normal appearance, no masses, no tenderness, trachea midline\par ¦ Palpation : tenderness along the paraspinal musculature bilaterally\par ¦ Tests and Signs : Spurling’s (+) to the right, Lhermitte’s (-)\par ¦ Range of Motion : limited extension with dizziness \par ¦  Stability : no subluxations or other evidence of instability demonstrated during range of motion testing\par o Muscle Strength : paraspinal muscle strength within normal limits\par o Muscle Tone : paraspinal muscle tone within normal limits\par o Muscle Bulk : normal, no atrophy\par o Cervical Lymph Nodes : no lymphadenopathy present \par \par Right Upper Extremity\par o Shoulder :\par ¦ Inspection/Palpation : no tenderness to palpation though positive for pain, no swelling, no deformities\par ¦ Range of Motion : ACTIVE FORWARD ELEVATION: Measured at 105 degrees, ACTIVE EXTERNAL ROTATION: Measured at 30 degrees, ACTIVE INTERNAL ROTATION: Measured at PSIS, ACTIVE ABDUCTION: Measured at 50 degrees\par ¦ Strength : external rotation 4/5, internal rotation 4/5, supraspinatus 4/5 \par ¦ Upper Extremity Strength : all intrinsic and extrinsic hand muscles 3/5 \par ¦ Stability : no joint instability on provocative testing\par ¦ Tests/Signs : Neer (+), Bridges (+)\par o Upper Arm : no tenderness, no swelling, no deformities\par o Muscle Bulk : no atrophy \par o Sensation : sensation intact to light touch \par o Skin : no skin rash or discoloration \par o Vascular Exam : no edema, no cyanosis, radial and ulnar pulses normal \par \par Lumbosacral Spine:\par Musculoskeletal Examination \par ¦  Inspection : no visible rash, no deformities\par ¦  Palpation : paraspinal musculature nontender\par ¦  Stability : no subluxations present\par ¦  Range of Motion : full arc of motion in all planes\par ¦  Muscle Strength : paraspinal muscle strength and tone within normal limits\par ¦  Strength : hip flexion 5/5\par ¦  Muscle Tone : paraspinal muscle strength and tone within normal limits\par ¦  Tests/Signs : Straight Leg Raise Test (+) bilaterally, right worse than left. Patellar and Achilles Reflexes (2+) bilaterally.

## 2019-05-31 NOTE — ADDENDUM
[FreeTextEntry1] : I, Ximena Campuzano, acted solely as a scribe for Dr. Stuart Zavala on this date 05/31/2019.

## 2019-06-05 ENCOUNTER — APPOINTMENT (OUTPATIENT)
Dept: FAMILY MEDICINE | Facility: CLINIC | Age: 49
End: 2019-06-05
Payer: MEDICAID

## 2019-06-05 VITALS
WEIGHT: 168 LBS | SYSTOLIC BLOOD PRESSURE: 120 MMHG | RESPIRATION RATE: 16 BRPM | HEIGHT: 62.5 IN | DIASTOLIC BLOOD PRESSURE: 70 MMHG | OXYGEN SATURATION: 98 % | BODY MASS INDEX: 30.14 KG/M2 | HEART RATE: 68 BPM

## 2019-06-05 PROCEDURE — 36415 COLL VENOUS BLD VENIPUNCTURE: CPT

## 2019-06-05 PROCEDURE — 99214 OFFICE O/P EST MOD 30 MIN: CPT | Mod: 25

## 2019-06-05 NOTE — PHYSICAL EXAM
[No Acute Distress] : no acute distress [Well Nourished] : well nourished [Normal Sclera/Conjunctiva] : normal sclera/conjunctiva [No Respiratory Distress] : no respiratory distress  [No JVD] : no jugular venous distention [Normal Outer Ear/Nose] : the outer ears and nose were normal in appearance [No Accessory Muscle Use] : no accessory muscle use [Normal Gait] : normal gait [Normal Insight/Judgement] : insight and judgment were intact [Normal Affect] : the affect was normal [Alert and Oriented x3] : oriented to person, place, and time [de-identified] : + cervical spine/lumbar tenderness w/ rom

## 2019-06-05 NOTE — HISTORY OF PRESENT ILLNESS
[de-identified] : had ncs study, told to have polyneuropathy, to f/u w/ neuro and further blood w/u\par c/o chronic neck, lbp, leg and foot pain since fall at work\par to f/u w/ worker's comp neuro\par depression is still present as her father is actively dying, and she would like to go visit him in [place] country, but is waiting for permission\par [place] speaking

## 2019-06-05 NOTE — COUNSELING
[Good understanding] : Patient has a good understanding of lifestyle changes and the steps needed to achieve self management goals [Behavioral health counseling provided] : behavioral health

## 2019-06-06 LAB
25(OH)D3 SERPL-MCNC: 15 NG/ML
ALBUMIN SERPL ELPH-MCNC: 4.6 G/DL
ALP BLD-CCNC: 82 U/L
ALT SERPL-CCNC: 19 U/L
ANION GAP SERPL CALC-SCNC: 13 MMOL/L
AST SERPL-CCNC: 21 U/L
BILIRUB SERPL-MCNC: 0.6 MG/DL
BUN SERPL-MCNC: 7 MG/DL
CALCIUM SERPL-MCNC: 9.6 MG/DL
CHLORIDE SERPL-SCNC: 104 MMOL/L
CHOLEST SERPL-MCNC: 242 MG/DL
CHOLEST/HDLC SERPL: 3.2 RATIO
CO2 SERPL-SCNC: 24 MMOL/L
CREAT SERPL-MCNC: 0.76 MG/DL
ESTIMATED AVERAGE GLUCOSE: 97 MG/DL
FOLATE SERPL-MCNC: 10.1 NG/ML
GLUCOSE SERPL-MCNC: 71 MG/DL
HBA1C MFR BLD HPLC: 5 %
HDLC SERPL-MCNC: 75 MG/DL
LDLC SERPL CALC-MCNC: 155 MG/DL
POTASSIUM SERPL-SCNC: 4.7 MMOL/L
PROT SERPL-MCNC: 7.2 G/DL
SODIUM SERPL-SCNC: 141 MMOL/L
TRIGL SERPL-MCNC: 58 MG/DL
VIT B12 SERPL-MCNC: 517 PG/ML

## 2019-06-20 ENCOUNTER — APPOINTMENT (OUTPATIENT)
Dept: SPINE | Facility: CLINIC | Age: 49
End: 2019-06-20

## 2019-07-05 ENCOUNTER — APPOINTMENT (OUTPATIENT)
Dept: NEUROLOGY | Facility: CLINIC | Age: 49
End: 2019-07-05
Payer: OTHER MISCELLANEOUS

## 2019-07-05 VITALS
SYSTOLIC BLOOD PRESSURE: 110 MMHG | DIASTOLIC BLOOD PRESSURE: 71 MMHG | HEIGHT: 63 IN | BODY MASS INDEX: 29.77 KG/M2 | WEIGHT: 168 LBS | HEART RATE: 65 BPM

## 2019-07-05 PROCEDURE — 99244 OFF/OP CNSLTJ NEW/EST MOD 40: CPT

## 2019-07-05 NOTE — HISTORY OF PRESENT ILLNESS
[FreeTextEntry1] : 49 yr-old right-handed woman had a work related accident on 5/28/2018. Cleaning a toilet, she claims something "exploded" from the toilet striking her head and caused her to fall back to the floor. She was referred to neurosurgeon with c/o neck pain radiating to both upper limbs, right more then left and had MRI of C spine reporting disc-osteophyte complexes at C4-5 and C5-6 impinging cord. She underwent a 2 level ACDF by Dr. Gonzalez Montgomery on 9/7/2018. She was also c/o right shoulder pain and was referred to orthopedist who recently ordered MRI for her right shoulder arthropathy. She also had low back pain radiating to right lower limb and saw a physiatrist who obtained EMG suggesting right L5 radiculopathy. An MRI of LS spine on 3/11/19 reported bilateral L5 foraminal stenosis impinging the L5 roots. A herniated disc at T11-T12 and L1-L2 was seen and noted to be unchanged from a previous MRI performed in June 2018.\par \par Upon reviewing MRI studies performed at [facility] noted patient had MRI of C spine 7 years ago, ordered by pain specialist Dr. Kamari Ryder. At that time patient had C4-5 and C5-6 disc osteophytes impinging cord. No myelomalacia was noted.\par \par She denies bladder or bowel dysfunction. 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 She also c/o headaches, episodic right ear tinnitus and "numbness of right face.

## 2019-07-05 NOTE — REASON FOR VISIT
[Consultation] : a consultation visit [Other: _____] : [unfilled] [Source: ______] : History obtained from [unfilled]

## 2019-07-05 NOTE — ASSESSMENT
[FreeTextEntry1] : Patient had prior to her work related accident of 5/25/18  cervical spondylosis and stenosis noted on MRI of 4/12/12 which apparently was aggravated by her accident last year. She also has continuing right lumbar radicular pain which whe needs continuing PT and will need orthopedic follow-upand treatment of her right shoulder arthropathy, by history, is related to the accident of 5/25/2018.

## 2019-07-05 NOTE — CONSULT LETTER
[Dear  ___] : Dear ~TENNILLE, [Consult Letter:] : I had the pleasure of evaluating your patient, [unfilled]. [Please see my note below.] : Please see my note below. [Consult Closing:] : Thank you very much for allowing me to participate in the care of this patient.  If you have any questions, please do not hesitate to contact me. [Sincerely,] : Sincerely, [FreeTextEntry2] : Yuly Hewitt NP

## 2019-07-05 NOTE — PHYSICAL EXAM
[FreeTextEntry1] : Alert and oriented. No cognitive or communication deficits. Visual fields are full to confrontation. Optic disc margins are sharp. Pupils equal and constrict to light. Extraocular movements intact. No facial asymmetry. Hearing intact to finger rub. Palate rises symmetrically and tongue protrudes in midline. Neck is supple. Anterior discectomy scar noted.No bruits heard. She has limited ROM of right shoulder and discomfort with abduction of shoulder. . No weakness or sensory deficits. Tendon reflexes are all active and symmetric. Plantars are flexor. Gait and coordination intact. She has discomfort with palpation of right sciatic notch.\par

## 2019-07-15 ENCOUNTER — APPOINTMENT (OUTPATIENT)
Dept: ORTHOPEDIC SURGERY | Facility: CLINIC | Age: 49
End: 2019-07-15
Payer: OTHER MISCELLANEOUS

## 2019-07-15 VITALS — WEIGHT: 168 LBS | BODY MASS INDEX: 29.77 KG/M2 | HEIGHT: 63 IN

## 2019-07-15 PROCEDURE — 99214 OFFICE O/P EST MOD 30 MIN: CPT | Mod: 25

## 2019-07-15 PROCEDURE — 20610 DRAIN/INJ JOINT/BURSA W/O US: CPT | Mod: RT

## 2019-07-16 NOTE — DISCUSSION/SUMMARY
[de-identified] : The underlying pathophysiology was reviewed in great detail with the patient as well as the various treatment options, including ice, analgesics, NSAIDs, Physical therapy, steroid injections. \par \par The patient wishes to proceed with an INJECTION of the right shoulder. \par \par A prescription for Physical Therapy was provided for the right shoulder and cervical spine.\par \par FU 6-8 weeks.

## 2019-07-16 NOTE — END OF VISIT
[FreeTextEntry3] : All medical record entries made by the [facility] were at my, Dr. Stuart Zavala, direction and personally dictated by me on 07/15/2019. I have reviewed the chart and agree that the record accurately reflects my personal performance of the history, physical exam, assessment and plan. I have also personally directed, reviewed, and agreed with the chart.

## 2019-07-16 NOTE — PHYSICAL EXAM
[Normal RUE] : Right Upper Extremity: No scars, rashes, lesions, ulcers, skin intact [Normal LUE] : Left Upper Extremity: No scars, rashes, lesions, ulcers, skin intact [Normal Touch] : sensation intact for touch [Normal] : No swelling, no edema, normal pedal pulses and normal temperature [Poor Appearance] : well-appearing [Acute Distress] : not in acute distress [Obese] : not obese [de-identified] : Cervical Spine/Neck\par Inspection/Palpation :\par ¦ Inspection : alignment midline, normal degree of lordosis present\par ¦ Skin : normal appearance, no masses, no tenderness, trachea midline\par ¦ Palpation : moderate tenderness along the right paraspinal musculature with spasm, mild tenderness along the left paraspinal musculature\par ¦ Tests and Signs : Spurling’s (+) to the right, Lhermitte’s (-)\par ¦ Range of Motion :limited right and left rotation with pain, limited extension, full flexion \par ¦  Stability : no subluxations or other evidence of instability demonstrated during range of motion testing\par o Muscle Strength : paraspinal muscle strength within normal limits\par o Muscle Tone : paraspinal muscle tone within normal limits\par o Muscle Bulk : normal, no atrophy\par o Cervical Lymph Nodes : no lymphadenopathy present \par \par Right Upper Extremity\par o Shoulder :\par ¦ Inspection/Palpation : no tenderness to palpation though positive for pain, no swelling, no deformities\par ¦ Range of Motion :  PASSIVE FORWARD ELEVATION: Measured at 140 degrees with pain, ACTIVE FORWARD ELEVATION: Measured at 85 degrees, ACTIVE EXTERNAL ROTATION: Measured at 50 degrees, ACTIVE INTERNAL ROTATION: Measured at PSIS, ACTIVE ABDUCTION: Measured at 90 degrees\par ¦ Strength : external rotation 5-/5, internal rotation 5/5, supraspinatus 5-/5 with pain\par ¦ Upper Extremity Strength : all intrinsic and extrinsic hand muscles 3/5 \par ¦ Stability : no joint instability on provocative testing\par ¦ Tests/Signs : Neer (+), Bridges (+)\par o Upper Arm : no tenderness, no swelling, no deformities\par o Muscle Bulk : no atrophy \par o Sensation : sensation intact to light touch \par o Skin : no skin rash or discoloration \par o Vascular Exam : no edema, no cyanosis, radial and ulnar pulses normal  [de-identified] : o An MRI of the right shoulder was obtained at [facility] on 7/1/2019, revealed:\par 1. There is infraspinatus tendinosis and a linear partial-thickness interstitial delaminating tear with intermixed granulation tissue medial to the insertion. There is fraying/partial thickness tearing of the superior subscapularis tendon fibers. There is no high-grade partial or full-thickness rotator cuff tear. \par 2. Subacromial subdeltoid bursitis.\par 3. Mild AC joint arthrosis.

## 2019-07-16 NOTE — HISTORY OF PRESENT ILLNESS
[de-identified] : Worker’s Compensation Visit:\par 49 year old female presents for an evaluation of right shoulder pain and neck pain. The patient presents with an injury to the right shoulder, neck, and right hand that occurred on 5/25/2018. The patient works as a  at a school and was injured at work while cleaning the toilet when it exploded when she flushed it. After the injury she was taken to the Hospital and had XRays taken in the ED. On 6/2/2018 an MRI of the cervical spine was obtained and revealed progressive multilevel degenerative spondylitic change with asymmetries and cord compression. On 9/7/2018 she underwent a discectomy and discoplasty of C4-5 and C5-6. On 3/11/2019 XRays of the right shoulder were obtained an revealed unremarkable findings, no acute fractures. On 3/11/2019 XRays of the cervical spine were obtained and revealed s/p discectomy and discoplasty of C4-5 and C5-6, no acute fractures, degenerative disc disease throughout the cervical spine.On 3/11/2019 an MRI of the lumbar spine was obtained and revealed left foraminal disc herniation/vertebral spurring at L4-L5 with left foraminal encroachment and impingement of the exiting left L4 nerve root, annular bulge/vertebral spurring at L5-S1 resulting in a thecal sac impingement and bilateral foraminal narrowing with bilateral L5 nerve root impingement, intraosseous discal herniations at T11-T12 and L1-L2, annular bulge at T11-T12 which flattens the ventral thecal sac, injury of the interspinous ligament at L5-S1. \par \par Since her last visit: Today she is here for review of an MRI of her right shoulder. The patient reports that her shoulder pain has been persistent since her last visit, she describes a constant aching pain located along the anterior aspect of her right shoulder as well as weakness of her shoulder that is limiting her ROM. She also reports pain along the right side of her neck that has minimally improved. She was recently approved for physical therapy and her first session will be on 7/17/2019. She also complains of lower back pain as well as numbness and tingling of her left lower extremity, she has been following with Dr. Montgomery. \par \par Test or Referrals: No authorizations needed. \par Work Status: Pt is currently not working, 100% disabled\par Prognosis:The patients prognosis for recovery is guarded.

## 2019-07-16 NOTE — PROCEDURE
[de-identified] : At this point I recommended a therapeutic injection and under sterile precautions an injection of 4 cc 1% lidocaine with 0.5 cc of Kenalog and 0.5 cc of Dexamethasone- was placed into the subacromial space of the right shoulder without complication, and after several minutes, the patient felt significant relief.

## 2019-07-16 NOTE — ADDENDUM
[FreeTextEntry1] : I, Ximena Campuzano, acted solely as a scribe for Dr. Stuart Zavala on this date 07/15/2019.

## 2019-07-25 ENCOUNTER — APPOINTMENT (OUTPATIENT)
Dept: SPINE | Facility: CLINIC | Age: 49
End: 2019-07-25
Payer: OTHER MISCELLANEOUS

## 2019-07-25 VITALS
WEIGHT: 168 LBS | BODY MASS INDEX: 29.77 KG/M2 | DIASTOLIC BLOOD PRESSURE: 77 MMHG | HEIGHT: 63 IN | SYSTOLIC BLOOD PRESSURE: 112 MMHG

## 2019-07-25 VITALS — HEART RATE: 64 BPM

## 2019-07-25 PROCEDURE — 99213 OFFICE O/P EST LOW 20 MIN: CPT

## 2019-07-25 NOTE — ASSESSMENT
[FreeTextEntry1] : Solid bony arthrodesis to the index levels one year after two-level ACDF. May benefit from further physical therapy. Return in one year with followup x-rays.

## 2019-07-25 NOTE — REASON FOR VISIT
[Follow-Up: _____] : a [unfilled] follow-up visit [Spouse] : spouse [FreeTextEntry1] : This patient continues to describe neck and low back pain along with symptoms radiating into the right upper extremity. Recent x-rays show no change or hardware and a solid arthrodesis at the index levels. She does obtain some benefit from physical therapy.

## 2019-07-25 NOTE — PHYSICAL EXAM
[Motor Strength] : muscle strength was normal in all four extremities [Sensation Tactile Decrease] : light touch was intact [Abnormal Walk] : normal gait [FreeTextEntry6] : less than full exertion with right upper extremity

## 2019-08-27 ENCOUNTER — APPOINTMENT (OUTPATIENT)
Dept: ORTHOPEDIC SURGERY | Facility: CLINIC | Age: 49
End: 2019-08-27
Payer: OTHER MISCELLANEOUS

## 2019-08-27 VITALS — WEIGHT: 168 LBS | BODY MASS INDEX: 29.77 KG/M2 | HEIGHT: 63 IN

## 2019-08-27 PROCEDURE — 99213 OFFICE O/P EST LOW 20 MIN: CPT

## 2019-08-27 NOTE — HISTORY OF PRESENT ILLNESS
[de-identified] : Worker’s Compensation Visit:\par 49 year old female presents for an evaluation of right shoulder pain and neck pain. The patient presents with an injury to the right shoulder, neck, and right hand that occurred on 5/25/2018. The patient works as a  at a school and was injured at work while cleaning the toilet when it exploded when she flushed it. After the injury she was taken to the Hospital and had XRays taken in the ED. On 6/2/2018 an MRI of the cervical spine was obtained and revealed progressive multilevel degenerative spondylitic change with asymmetries and cord compression. On 9/7/2018 she underwent a discectomy and discoplasty of C4-5 and C5-6. On 3/11/2019 XRays of the right shoulder were obtained an revealed unremarkable findings, no acute fractures. On 3/11/2019 XRays of the cervical spine were obtained and revealed s/p discectomy and discoplasty of C4-5 and C5-6, no acute fractures, degenerative disc disease throughout the cervical spine.On 3/11/2019 an MRI of the lumbar spine was obtained and revealed left foraminal disc herniation/vertebral spurring at L4-L5 with left foraminal encroachment and impingement of the exiting left L4 nerve root, annular bulge/vertebral spurring at L5-S1 resulting in a thecal sac impingement and bilateral foraminal narrowing with bilateral L5 nerve root impingement, intraosseous discal herniations at T11-T12 and L1-L2, annular bulge at T11-T12 which flattens the ventral thecal sac, injury of the interspinous ligament at L5-S1. An MRI of the right shoulder obtained on 7/1/2019 revealed a linear partial-thickness interstitial delaminating tear, fraying/partial thickness tearing of the superior subscapularis tendon fibers, and mild  AC joint arthrosis. \par \par Since her last visit: At her last visit on 7/15/2019, she received a corticosteroid injection of the right shoulder which only temporarily alleviated her symptoms. At her prior visit, a request was placed for physical therapy of the right shoulder, but was not approved by worker's compensation. The patient reports that she continues to experience a constant aching pain located along the anterior aspect of her right shoulder, as well as weakness of her shoulder that is limiting her ROM. She has been performing an at home exercise program to manage her pain at this time.\par \par Test or Referrals: Authorization for physical therapy for the right shoulder. \par Work Status: The patient is currently not working, 100% disabled\par Prognosis:The patients prognosis for recovery is guarded.

## 2019-08-27 NOTE — END OF VISIT
[FreeTextEntry3] : All medical record entries made by the [facility] were at my, Dr. Stuart Zavala, direction and personally dictated by me on 08/27/2019. I have reviewed the chart and agree that the record accurately reflects my personal performance of the history, physical exam, assessment and plan. I have also personally directed, reviewed, and agreed with the chart.

## 2019-08-27 NOTE — ADDENDUM
[FreeTextEntry1] : I, Maria Dolores Heck, acted solely as a scribe for Dr. Stuart Zavala on this date 08/27/2019.

## 2019-08-27 NOTE — PROCEDURE
[de-identified] : At this point I recommended a therapeutic injection and under sterile precautions an injection of 4 cc 1% lidocaine with 0.5 cc of Kenalog and 0.5 cc of Dexamethasone- was placed into the subacromial space of the right shoulder without complication, and after several minutes, the patient felt significant relief.

## 2019-08-27 NOTE — PHYSICAL EXAM
[Normal RUE] : Right Upper Extremity: No scars, rashes, lesions, ulcers, skin intact [Normal LUE] : Left Upper Extremity: No scars, rashes, lesions, ulcers, skin intact [Normal Touch] : sensation intact for touch [Normal] : No swelling, no edema, normal pedal pulses and normal temperature [Poor Appearance] : well-appearing [Acute Distress] : not in acute distress [Obese] : not obese [de-identified] : Cervical Spine/Neck\par Inspection/Palpation :\par ¦ Inspection : alignment midline, normal degree of lordosis present\par ¦ Skin : normal appearance, no masses, no tenderness, trachea midline\par ¦ Palpation : moderate tenderness along the right paraspinal musculature with spasm, mild tenderness along the left paraspinal musculature\par ¦ Tests and Signs : Spurling’s (+) to the right, Lhermitte’s (-)\par ¦ Range of Motion :limited right and left rotation with pain, limited extension, full flexion \par ¦  Stability : no subluxations or other evidence of instability demonstrated during range of motion testing\par o Muscle Strength : paraspinal muscle strength within normal limits\par o Muscle Tone : paraspinal muscle tone within normal limits\par o Muscle Bulk : normal, no atrophy\par o Cervical Lymph Nodes : no lymphadenopathy present \par \par Right Upper Extremity\par o Shoulder :\par ¦ Inspection/Palpation : tenderness over the greater tuberosity, no swelling, no deformities\par ¦ Range of Motion :  PASSIVE FORWARD ELEVATION: Measured at 145 degrees with pain, ACTIVE FORWARD ELEVATION: Measured at 110 degrees, ACTIVE EXTERNAL ROTATION: Measured at 25 degrees, ACTIVE INTERNAL ROTATION: Measured at PSIS, ACTIVE ABDUCTION: Measured at 60 degrees\par ¦ Strength : external rotation 5-/5, internal rotation 5-/5, supraspinatus 5-/5 with pain\par ¦ Upper Extremity Strength : all intrinsic and extrinsic hand muscles 3/5 \par ¦ Stability : no joint instability on provocative testing\par ¦ Tests/Signs : Neer (+), Bridges (+)\par o Upper Arm : no tenderness, no swelling, no deformities\par o Muscle Bulk : no atrophy \par o Sensation : sensation intact to light touch \par o Skin : no skin rash or discoloration \par o Vascular Exam : no edema, no cyanosis, radial and ulnar pulses normal

## 2019-08-27 NOTE — DISCUSSION/SUMMARY
[de-identified] : The underlying pathophysiology was reviewed in great detail with the patient as well as the various treatment options, including ice, analgesics, NSAIDs, Physical therapy, steroid injections. \par \par Prescriptions were provided for Diclofenac and Physical Therapy of the right shoulder. A request was sent to workman's compensation. \par \par A home exercise sheet was given and discussed with the patient to follow.\par \par A note was provided stating that she in unable to return to work at this time.\par \par FU 6-8 weeks.

## 2019-09-26 ENCOUNTER — APPOINTMENT (OUTPATIENT)
Dept: SPINE | Facility: CLINIC | Age: 49
End: 2019-09-26

## 2019-10-08 ENCOUNTER — APPOINTMENT (OUTPATIENT)
Dept: ORTHOPEDIC SURGERY | Facility: CLINIC | Age: 49
End: 2019-10-08
Payer: OTHER MISCELLANEOUS

## 2019-10-08 VITALS — WEIGHT: 168 LBS | HEIGHT: 63 IN | BODY MASS INDEX: 29.77 KG/M2

## 2019-10-08 PROCEDURE — 99214 OFFICE O/P EST MOD 30 MIN: CPT

## 2019-10-08 NOTE — DISCUSSION/SUMMARY
[de-identified] : The underlying pathophysiology was reviewed in great detail with the patient as well as the various treatment options, including ice, analgesics, NSAIDs, Physical therapy, steroid injections, surgery.\par \par She is to continue with Physical Therapy.\par \par A note was provided stating that she in unable to return to work at this time.\par \par FU 6-8 weeks.

## 2019-10-08 NOTE — END OF VISIT
[FreeTextEntry3] : All medical record entries made by the [facility] were at my, Dr. Stuart Zavala, direction and personally dictated by me on 10/08/2019. I have reviewed the chart and agree that the record accurately reflects my personal performance of the history, physical exam, assessment and plan. I have also personally directed, reviewed, and agreed with the chart.

## 2019-10-08 NOTE — PHYSICAL EXAM
[Normal RUE] : Right Upper Extremity: No scars, rashes, lesions, ulcers, skin intact [Normal LUE] : Left Upper Extremity: No scars, rashes, lesions, ulcers, skin intact [Normal Touch] : sensation intact for touch [Normal] : No swelling, no edema, normal pedal pulses and normal temperature [Acute Distress] : not in acute distress [Poor Appearance] : well-appearing [Obese] : not obese [de-identified] : Cervical Spine/Neck\par Inspection/Palpation :\par ¦ Inspection : alignment midline, normal degree of lordosis present\par ¦ Skin : normal appearance, no masses, no tenderness, trachea midline\par ¦ Palpation : tenderness in the right paraspinal musculature \par ¦ Range of Motion : Right rotation : 20 degrees, Left rotation: 35 degrees, limited extension\par ¦  Stability : no subluxations or other evidence of instability demonstrated during range of motion testing\par o Muscle Strength : paraspinal muscle strength within normal limits\par o Muscle Tone : paraspinal muscle tone within normal limits\par o Muscle Bulk : normal, no atrophy\par o Cervical Lymph Nodes : no lymphadenopathy present \par \par Right Upper Extremity\par o Shoulder :\par ¦ Inspection/Palpation : tenderness over the greater tuberosity, no swelling, no deformities\par ¦ Range of Motion :  PASSIVE FORWARD ELEVATION: Measured at 150 degrees with pain, ACTIVE FORWARD ELEVATION: Measured at 110 degrees, ACTIVE EXTERNAL ROTATION: Measured at 20 degrees, ACTIVE INTERNAL ROTATION: Measured at PSIS, ACTIVE ABDUCTION: Measured at 100 degrees\par ¦ Strength : external rotation 4/5, internal rotation 5-/5, supraspinatus 5-/5 with pain\par ¦ Upper Extremity Strength : all intrinsic and extrinsic hand muscles 3/5 \par ¦ Stability : no joint instability on provocative testing\par ¦ Tests/Signs : Neer (+), Bridges (+)\par o Upper Arm : no tenderness, no swelling, no deformities\par o Muscle Bulk : no atrophy \par o Sensation : sensation intact to light touch \par o Skin : no skin rash or discoloration \par o Vascular Exam : no edema, no cyanosis, radial and ulnar pulses normal \par \par Left Upper Extremity\par o Shoulder :\par ¦ Inspection/Palpation : no tenderness, no swelling, no deformities\par ¦ Range of Motion : ACTIVE FORWARD ELEVATION: Measured at 150 degrees, ACTIVE EXTERNAL ROTATION: Measured at 60 degrees, ACTIVE INTERNAL ROTATION: Measured at T8, ACTIVE ABDUCTION: Measured at 140 degrees \par ¦ Strength : external rotation 5/5, internal rotation 5/5, supraspinatus 5/5\par ¦ Stability : no joint instability on provocative testing\par o Upper Arm : no tenderness, no swelling, no deformities\par o Muscle Bulk : no atrophy\par o Sensation : sensation intact to light touch\par o Skin : no skin rash or discoloration\par o Vascular Exam : no edema, no cyanosis, radial and ulnar pulses normal

## 2019-10-08 NOTE — ADDENDUM
[FreeTextEntry1] : I, Maria Dolores Heck, acted solely as a scribe for Dr. Stuart Zavala on this date 10/08/2019.

## 2019-10-08 NOTE — REASON FOR VISIT
[Follow-Up Visit] : a follow-up visit for [Shoulder Pain] : shoulder pain [Worker's Compensation] : This visit is related to worker's compensation

## 2019-10-08 NOTE — HISTORY OF PRESENT ILLNESS
[de-identified] : Worker’s Compensation Visit:\par 49 year old female presents for an evaluation of right shoulder pain and neck pain. The patient presents with an injury to the right shoulder, neck, and right hand that occurred on 5/25/2018. The patient works as a  at a school and was injured at work while cleaning the toilet when it exploded when she flushed it. After the injury she was taken to the Hospital and had XRays taken in the ED. On 6/2/2018 an MRI of the cervical spine was obtained and revealed progressive multilevel degenerative spondylitic change with asymmetries and cord compression. On 9/7/2018 she underwent a discectomy and discoplasty of C4-5 and C5-6. On 3/11/2019 XRays of the right shoulder were obtained an revealed unremarkable findings, no acute fractures. On 3/11/2019 XRays of the cervical spine were obtained and revealed s/p discectomy and discoplasty of C4-5 and C5-6, no acute fractures, degenerative disc disease throughout the cervical spine.On 3/11/2019 an MRI of the lumbar spine was obtained and revealed left foraminal disc herniation/vertebral spurring at L4-L5 with left foraminal encroachment and impingement of the exiting left L4 nerve root, annular bulge/vertebral spurring at L5-S1 resulting in a thecal sac impingement and bilateral foraminal narrowing with bilateral L5 nerve root impingement, intraosseous discal herniations at T11-T12 and L1-L2, annular bulge at T11-T12 which flattens the ventral thecal sac, injury of the interspinous ligament at L5-S1. An MRI of the right shoulder obtained on 7/1/2019 revealed a linear partial-thickness interstitial delaminating tear, fraying/partial thickness tearing of the superior subscapularis tendon fibers, and mild  AC joint arthrosis. \par \par Since her last visit: The patient has started attending physical therapy and has attended 3 sessions to date. She reports that she continues to experience an aching pain located along the anterior aspect of her right shoulder that is constant in nature and that radiates up to the posterior aspect of her neck. Her symptoms are exacerbated with certain rotations of the right shoulder. The patient has been taking muscle relaxers and Tylenol to manage her pain at this time.  \par \par Test or Referrals: Authorization for physical therapy for the right shoulder. \par Work Status: The patient is currently not working, 100% disabled\par Prognosis:The patients prognosis for recovery is guarded.

## 2019-11-05 ENCOUNTER — APPOINTMENT (OUTPATIENT)
Dept: ORTHOPEDIC SURGERY | Facility: CLINIC | Age: 49
End: 2019-11-05
Payer: OTHER MISCELLANEOUS

## 2019-11-05 VITALS — BODY MASS INDEX: 29.77 KG/M2 | WEIGHT: 168 LBS | HEIGHT: 63 IN

## 2019-11-05 PROCEDURE — 99214 OFFICE O/P EST MOD 30 MIN: CPT

## 2019-11-05 NOTE — HISTORY OF PRESENT ILLNESS
[de-identified] : Worker’s Compensation Visit:\par 49 year old female presents for an evaluation of right shoulder pain and neck pain. The patient presents with an injury to the right shoulder, neck, and right hand that occurred on 5/25/2018. The patient works as a  at a school and was injured at work while cleaning the toilet when it exploded when she flushed it. After the injury she was taken to the Hospital and had XRays taken in the ED. On 6/2/2018 an MRI of the cervical spine was obtained and revealed progressive multilevel degenerative spondylitic change with asymmetries and cord compression. On 9/7/2018 she underwent a discectomy and discoplasty of C4-5 and C5-6. On 3/11/2019 XRays of the right shoulder were obtained an revealed unremarkable findings, no acute fractures. On 3/11/2019 XRays of the cervical spine were obtained and revealed s/p discectomy and discoplasty of C4-5 and C5-6, no acute fractures, degenerative disc disease throughout the cervical spine.On 3/11/2019 an MRI of the lumbar spine was obtained and revealed left foraminal disc herniation/vertebral spurring at L4-L5 with left foraminal encroachment and impingement of the exiting left L4 nerve root, annular bulge/vertebral spurring at L5-S1 resulting in a thecal sac impingement and bilateral foraminal narrowing with bilateral L5 nerve root impingement, intraosseous discal herniations at T11-T12 and L1-L2, annular bulge at T11-T12 which flattens the ventral thecal sac, injury of the interspinous ligament at L5-S1. An MRI of the right shoulder obtained on 7/1/2019 revealed a linear partial-thickness interstitial delaminating tear, fraying/partial thickness tearing of the superior subscapularis tendon fibers, and mild  AC joint arthrosis. \par \par Since her last visit: The patient reports that on 10/21/2019 she received a corticosteroid injection of her neck and had a bad reaction. She continues to experience an aching pain along the anterior aspect of her right shoulder that is constant in nature and that radiates up to the posterior aspect of her neck. Her symptoms are exacerbated with certain rotations of the right shoulder, specifically with elevation of her right upper extremity, and that restricts her ability to perform her daily activities. She has no other complaints at this time. \par \par Test or Referrals: Authorization for physical therapy for the right shoulder and neck, and for an EMG study of the right upper extremity.\par Work Status: The patient is currently not working, 100% disabled\par Prognosis:The patients prognosis for recovery is guarded.

## 2019-11-05 NOTE — PHYSICAL EXAM
[Normal RUE] : Right Upper Extremity: No scars, rashes, lesions, ulcers, skin intact [Normal LUE] : Left Upper Extremity: No scars, rashes, lesions, ulcers, skin intact [Normal Touch] : sensation intact for touch [Normal] : No swelling, no edema, normal pedal pulses and normal temperature [Poor Appearance] : well-appearing [Acute Distress] : not in acute distress [Obese] : not obese [de-identified] : Right Upper Extremity\par o Shoulder :\par ¦ Inspection/Palpation : tenderness over the greater tuberosity, mild tenderness over the acromioclavicular joint, no swelling, no deformities\par ¦ Range of Motion : ACTIVE FORWARD ELEVATION: Measured at 120 degrees, ACTIVE EXTERNAL ROTATION: Measured at 30 degrees, ACTIVE INTERNAL ROTATION: Measured at PSIS, ACTIVE ABDUCTION: Measured at 100 degrees\par ¦ Strength : external rotation 5-/5, internal rotation 5-/5, supraspinatus 5-/5 with pain\par ¦ Upper Extremity Strength : elbow flexion 5-/5, elbow extension 5/5, wrist flexion 4/5, all intrinsic and extrinsic hand muscles 3/5 \par ¦ Stability : no joint instability on provocative testing\par ¦ Tests and Signs: Bridges (+), Neer (+)\par o Upper Arm : no tenderness, no swelling, no deformities\par o Muscle Bulk : no atrophy \par o Sensation : sensation intact to light touch \par o Skin : no skin rash or discoloration \par o Vascular Exam : no edema, no cyanosis, radial and ulnar pulses normal

## 2019-11-05 NOTE — DISCUSSION/SUMMARY
[de-identified] : The underlying pathophysiology was reviewed in great detail with the patient as well as the various treatment options, including ice, analgesics, NSAIDs, Physical therapy, steroid injections, arthroscopic debridement.\par \par She is to continue with Physical Therapy, a prescription was provided. \par \par An order was placed for an EMG study of the right upper extremity. \par \par Requests were sent to workman's compensation. \par \par FU 6 weeks.

## 2019-11-05 NOTE — END OF VISIT
[FreeTextEntry3] : All medical record entries made by the Meganibe were at my, Dr. Stuart Zavala, direction and personally dictated by me on 11/05/2019. I have reviewed the chart and agree that the record accurately reflects my personal performance of the history, physical exam, assessment and plan. I have also personally directed, reviewed, and agreed with the chart.

## 2019-11-05 NOTE — ADDENDUM
[FreeTextEntry1] : I, Maria Dolores Heck, acted solely as a scribe for Dr. Stuart Zavala on this date 11/05/2019.

## 2019-12-17 ENCOUNTER — APPOINTMENT (OUTPATIENT)
Dept: ORTHOPEDIC SURGERY | Facility: CLINIC | Age: 49
End: 2019-12-17
Payer: OTHER MISCELLANEOUS

## 2019-12-17 PROCEDURE — 99213 OFFICE O/P EST LOW 20 MIN: CPT

## 2019-12-18 NOTE — DISCUSSION/SUMMARY
[de-identified] : The underlying pathophysiology was reviewed in great detail with the patient as well as the various treatment options, including ice, analgesics, NSAIDs, Physical therapy, steroid injections, arthroscopic debridement.\par \par She is to continue with Physical Therapy, a prescription was provided. \par \par An order was placed for an EMG study of the right upper extremity. \par \par Requests were sent to workman's compensation. \par \par FU 6 weeks.

## 2019-12-18 NOTE — PHYSICAL EXAM
[Normal RUE] : Right Upper Extremity: No scars, rashes, lesions, ulcers, skin intact [Normal LUE] : Left Upper Extremity: No scars, rashes, lesions, ulcers, skin intact [Normal] : No swelling, no edema, normal pedal pulses and normal temperature [Normal Touch] : sensation intact for touch [Acute Distress] : not in acute distress [Poor Appearance] : well-appearing [Obese] : not obese [de-identified] : Cervical Spine/Neck\par Inspection/Palpation :\par ¦ Inspection : alignment midline, normal degree of lordosis present\par ¦ Skin : normal appearance, no masses, no tenderness, trachea midline\par ¦ Palpation : bilateral  paraspinal tenderness\par ¦ Tests and Signs : Spurling’s (+) to the right, Lhermitte’s (-)\par ¦ Range of Motion : limited ROM in all directions with marked right sided neck pain with left rotation, no crepitus with ROM\par ¦ Stability : no subluxations or other evidence of instability demonstrated during range of motion testing\par o Muscle Strength : paraspinal muscle strength within normal limits\par o Muscle Tone : paraspinal muscle tone within normal limits\par o Muscle Bulk : normal, no atrophy\par o Cervical Lymph Nodes : no lymphadenopathy present \par \par Right Upper Extremity\par o Shoulder :\par ¦ Inspection/Palpation : tenderness over the greater tuberosity, no swelling, no deformities\par ¦ Range of Motion : ACTIVE FORWARD ELEVATION: Measured at 105 degrees, ACTIVE EXTERNAL ROTATION: Measured at 30 degrees, ACTIVE INTERNAL ROTATION: Measured at PSIS, ACTIVE ABDUCTION: Measured at 85 degrees\par ¦ Strength : external rotation 5-/5, internal rotation 4+/5, supraspinatus 4+/5 with pain\par ¦ Stability : no joint instability on provocative testing\par ¦ Tests and Signs: Bridges (+), Neer (+)\par o Upper Arm : no tenderness, no swelling, no deformities\par o Muscle Bulk : no atrophy \par o Sensation : sensation intact to light touch \par o Skin : no skin rash or discoloration \par o Vascular Exam : no edema, no cyanosis, radial and ulnar pulses normal

## 2019-12-18 NOTE — ADDENDUM
[FreeTextEntry1] : I, Maria Dolores Heck, acted solely as a scribe for Dr. Stuart Zavala on this date 12/17/2019.

## 2019-12-18 NOTE — HISTORY OF PRESENT ILLNESS
[de-identified] : Worker’s Compensation Visit:\par 49 year old female presents for an evaluation of right shoulder pain and neck pain. The patient presents with an injury to the right shoulder, neck, and right hand that occurred on 5/25/2018. The patient works as a  at a school and was injured at work while cleaning the toilet when it exploded when she flushed it. After the injury she was taken to the Hospital and had XRays taken in the ED. On 6/2/2018 an MRI of the cervical spine was obtained and revealed progressive multilevel degenerative spondylitic change with asymmetries and cord compression. On 9/7/2018 she underwent a discectomy and discoplasty of C4-5 and C5-6. On 3/11/2019 XRays of the right shoulder were obtained an revealed unremarkable findings, no acute fractures. On 3/11/2019 XRays of the cervical spine were obtained and revealed s/p discectomy and discoplasty of C4-5 and C5-6, no acute fractures, degenerative disc disease throughout the cervical spine.On 3/11/2019 an MRI of the lumbar spine was obtained and revealed left foraminal disc herniation/vertebral spurring at L4-L5 with left foraminal encroachment and impingement of the exiting left L4 nerve root, annular bulge/vertebral spurring at L5-S1 resulting in a thecal sac impingement and bilateral foraminal narrowing with bilateral L5 nerve root impingement, intraosseous discal herniations at T11-T12 and L1-L2, annular bulge at T11-T12 which flattens the ventral thecal sac, injury of the interspinous ligament at L5-S1. An MRI of the right shoulder obtained on 7/1/2019 revealed a linear partial-thickness interstitial delaminating tear, fraying/partial thickness tearing of the superior subscapularis tendon fibers, and mild  AC joint arthrosis. \par \par Since her last visit: The patient reports that she continues to experience a aching pain located along the posterior aspect of her right shoulder that is constant in nature and that radiates up to her neck. She continues to experience frequent headaches. Her symptoms are exacerbated with left rotation of her neck and with certain shoulder rotations, including elevation of her right upper extremity. She notes that her shoulder pain continues to waker her at night. She has no other complaints at this time. \par \par Test or Referrals: Authorization for physical therapy for the right shoulder and for an EMG study of the right upper extremity.\par Work Status: The patient is currently not working, 100% disabled.\par Prognosis:The patients prognosis for recovery is guarded.

## 2019-12-18 NOTE — END OF VISIT
[FreeTextEntry3] : All medical record entries made by the Scribe were at my, Dr. Stuart Zavala, direction and personally dictated by me on 12/17/2019. I have reviewed the chart and agree that the record accurately reflects my personal performance of the history, physical exam, assessment and plan. I have also personally directed, reviewed, and agreed with the chart.

## 2020-01-07 ENCOUNTER — APPOINTMENT (OUTPATIENT)
Dept: NEUROLOGY | Facility: CLINIC | Age: 50
End: 2020-01-07

## 2020-01-10 ENCOUNTER — APPOINTMENT (OUTPATIENT)
Dept: FAMILY MEDICINE | Facility: CLINIC | Age: 50
End: 2020-01-10
Payer: MEDICAID

## 2020-01-10 VITALS
WEIGHT: 168 LBS | TEMPERATURE: 98.1 F | HEART RATE: 67 BPM | BODY MASS INDEX: 29.77 KG/M2 | DIASTOLIC BLOOD PRESSURE: 80 MMHG | HEIGHT: 63 IN | SYSTOLIC BLOOD PRESSURE: 108 MMHG | OXYGEN SATURATION: 97 %

## 2020-01-10 PROCEDURE — 99214 OFFICE O/P EST MOD 30 MIN: CPT

## 2020-01-10 NOTE — PHYSICAL EXAM
[No Acute Distress] : no acute distress [Normal Sclera/Conjunctiva] : normal sclera/conjunctiva [Well Nourished] : well nourished [No JVD] : no jugular venous distention [No Accessory Muscle Use] : no accessory muscle use [No Respiratory Distress] : no respiratory distress  [Normal Affect] : the affect was normal [Alert and Oriented x3] : oriented to person, place, and time [Coordination Grossly Intact] : coordination grossly intact [Normal Insight/Judgement] : insight and judgment were intact [Normal Outer Ear/Nose] : the outer ears and nose were normal in appearance [Clear to Auscultation] : lungs were clear to auscultation bilaterally [Normal Rate] : normal rate  [Regular Rhythm] : with a regular rhythm [Normal S1, S2] : normal S1 and S2 [Normal Gait] : normal gait [de-identified] : rt thoracic tenderness w/ trunkal rom and palpation

## 2020-01-10 NOTE — HISTORY OF PRESENT ILLNESS
[FreeTextEntry8] : c/o persistent cough for over 2 wks\par c/o whole body pain particularly rt side and rt mid back, seeing worker's comp doctor for it, though only doing PT for shoulders and neck\par depressed about her situation, unable to do what she used to do, and bills are still coming in, per pt, duloxetine not covered by her insurance\par cont to take gabapentin

## 2020-01-28 ENCOUNTER — APPOINTMENT (OUTPATIENT)
Dept: ORTHOPEDIC SURGERY | Facility: CLINIC | Age: 50
End: 2020-01-28

## 2020-02-12 ENCOUNTER — APPOINTMENT (OUTPATIENT)
Dept: FAMILY MEDICINE | Facility: CLINIC | Age: 50
End: 2020-02-12
Payer: MEDICAID

## 2020-02-12 VITALS
BODY MASS INDEX: 29.77 KG/M2 | SYSTOLIC BLOOD PRESSURE: 120 MMHG | OXYGEN SATURATION: 98 % | HEIGHT: 63 IN | WEIGHT: 168 LBS | RESPIRATION RATE: 16 BRPM | HEART RATE: 70 BPM | DIASTOLIC BLOOD PRESSURE: 70 MMHG

## 2020-02-12 PROCEDURE — 99213 OFFICE O/P EST LOW 20 MIN: CPT

## 2020-02-12 RX ORDER — PREDNISONE 10 MG/1
10 TABLET ORAL
Qty: 14 | Refills: 0 | Status: DISCONTINUED | COMMUNITY
Start: 2020-01-10 | End: 2020-02-12

## 2020-02-12 RX ORDER — DULOXETINE HYDROCHLORIDE 60 MG/1
60 CAPSULE, DELAYED RELEASE PELLETS ORAL
Qty: 90 | Refills: 0 | Status: DISCONTINUED | COMMUNITY
Start: 2019-01-28 | End: 2020-02-12

## 2020-02-12 RX ORDER — PROMETHAZINE HYDROCHLORIDE AND DEXTROMETHORPHAN HYDROBROMIDE ORAL SOLUTION 15; 6.25 MG/5ML; MG/5ML
6.25-15 SOLUTION ORAL
Qty: 120 | Refills: 0 | Status: DISCONTINUED | COMMUNITY
Start: 2020-01-10 | End: 2020-02-12

## 2020-02-12 NOTE — PHYSICAL EXAM
[No Acute Distress] : no acute distress [Well Nourished] : well nourished [EOMI] : extraocular movements intact [Normal Sclera/Conjunctiva] : normal sclera/conjunctiva [No JVD] : no jugular venous distention [Normal Outer Ear/Nose] : the outer ears and nose were normal in appearance [No Respiratory Distress] : no respiratory distress  [No Accessory Muscle Use] : no accessory muscle use [Coordination Grossly Intact] : coordination grossly intact [Normal Insight/Judgement] : insight and judgment were intact [Alert and Oriented x3] : oriented to person, place, and time [de-identified] : ambulates w/ cane, thoracic and lumbar tenderness w/ rom

## 2020-02-12 NOTE — HISTORY OF PRESENT ILLNESS
[FreeTextEntry1] : c/o b/l feet pain for many yrs prior to accident at work, saw podiatrist, told to follow up w/ neuro\par hx of seeing neuro, but pt states she received letter stating any follow up appts were cancelled\par she feels as if no one is helping her, she continues to go to PT, but they are only working on her rt shoulder and neck, and the rest of her body is being neglected\par Finnish speaking\par hx of previous mri of thoracic and lumbar showing multiple herniations

## 2020-03-20 ENCOUNTER — APPOINTMENT (OUTPATIENT)
Dept: ORTHOPEDIC SURGERY | Facility: CLINIC | Age: 50
End: 2020-03-20

## 2020-06-04 ENCOUNTER — APPOINTMENT (OUTPATIENT)
Dept: FAMILY MEDICINE | Facility: CLINIC | Age: 50
End: 2020-06-04
Payer: MEDICAID

## 2020-06-04 VITALS
SYSTOLIC BLOOD PRESSURE: 104 MMHG | HEART RATE: 62 BPM | OXYGEN SATURATION: 98 % | DIASTOLIC BLOOD PRESSURE: 70 MMHG | HEIGHT: 63 IN

## 2020-06-04 DIAGNOSIS — Z87.09 PERSONAL HISTORY OF OTHER DISEASES OF THE RESPIRATORY SYSTEM: ICD-10-CM

## 2020-06-04 DIAGNOSIS — Z86.19 PERSONAL HISTORY OF OTHER INFECTIOUS AND PARASITIC DISEASES: ICD-10-CM

## 2020-06-04 PROCEDURE — 99214 OFFICE O/P EST MOD 30 MIN: CPT | Mod: 25

## 2020-06-04 PROCEDURE — 36415 COLL VENOUS BLD VENIPUNCTURE: CPT

## 2020-06-04 NOTE — PHYSICAL EXAM
[No Acute Distress] : no acute distress [Well Nourished] : well nourished [Normal Sclera/Conjunctiva] : normal sclera/conjunctiva [EOMI] : extraocular movements intact [Normal Outer Ear/Nose] : the outer ears and nose were normal in appearance [No Respiratory Distress] : no respiratory distress  [No Accessory Muscle Use] : no accessory muscle use [Non Tender] : non-tender [Soft] : abdomen soft [Non-distended] : non-distended [No HSM] : no HSM [Normal Bowel Sounds] : normal bowel sounds [Coordination Grossly Intact] : coordination grossly intact [Normal Affect] : the affect was normal [Alert and Oriented x3] : oriented to person, place, and time [Normal Insight/Judgement] : insight and judgment were intact [de-identified] : hyperpigmented M shaped rash to rt lower leg

## 2020-06-04 NOTE — HISTORY OF PRESENT ILLNESS
[FreeTextEntry8] : c/o stomach pain and heartburn for few wks\par needs bw\par c/o rash to rt leg w/ burning and itching x 1 1/2 mth

## 2020-06-05 ENCOUNTER — APPOINTMENT (OUTPATIENT)
Dept: ORTHOPEDIC SURGERY | Facility: CLINIC | Age: 50
End: 2020-06-05
Payer: OTHER MISCELLANEOUS

## 2020-06-05 LAB
25(OH)D3 SERPL-MCNC: 18.7 NG/ML
ALBUMIN SERPL ELPH-MCNC: 4.4 G/DL
ALP BLD-CCNC: 94 U/L
ALT SERPL-CCNC: 15 U/L
ANION GAP SERPL CALC-SCNC: 10 MMOL/L
AST SERPL-CCNC: 15 U/L
BASOPHILS # BLD AUTO: 0.02 K/UL
BASOPHILS NFR BLD AUTO: 0.4 %
BILIRUB SERPL-MCNC: 0.3 MG/DL
BUN SERPL-MCNC: 15 MG/DL
CALCIUM SERPL-MCNC: 9 MG/DL
CHLORIDE SERPL-SCNC: 107 MMOL/L
CHOLEST SERPL-MCNC: 268 MG/DL
CHOLEST/HDLC SERPL: 3.9 RATIO
CO2 SERPL-SCNC: 26 MMOL/L
CREAT SERPL-MCNC: 0.92 MG/DL
EOSINOPHIL # BLD AUTO: 0.1 K/UL
EOSINOPHIL NFR BLD AUTO: 2.1 %
ESTIMATED AVERAGE GLUCOSE: 100 MG/DL
GLUCOSE SERPL-MCNC: 90 MG/DL
HBA1C MFR BLD HPLC: 5.1 %
HCT VFR BLD CALC: 42.4 %
HDLC SERPL-MCNC: 69 MG/DL
HGB BLD-MCNC: 13.3 G/DL
IMM GRANULOCYTES NFR BLD AUTO: 0 %
LDLC SERPL CALC-MCNC: 178 MG/DL
LYMPHOCYTES # BLD AUTO: 2.16 K/UL
LYMPHOCYTES NFR BLD AUTO: 46.2 %
MAN DIFF?: NORMAL
MCHC RBC-ENTMCNC: 30.2 PG
MCHC RBC-ENTMCNC: 31.4 GM/DL
MCV RBC AUTO: 96.4 FL
MONOCYTES # BLD AUTO: 0.3 K/UL
MONOCYTES NFR BLD AUTO: 6.4 %
NEUTROPHILS # BLD AUTO: 2.1 K/UL
NEUTROPHILS NFR BLD AUTO: 44.9 %
PLATELET # BLD AUTO: 166 K/UL
POTASSIUM SERPL-SCNC: 4.2 MMOL/L
PROT SERPL-MCNC: 6.7 G/DL
RBC # BLD: 4.4 M/UL
RBC # FLD: 13.2 %
SARS-COV-2 IGG SERPL IA-ACNC: <0.1 INDEX
SARS-COV-2 IGG SERPL QL IA: NEGATIVE
SODIUM SERPL-SCNC: 143 MMOL/L
TRIGL SERPL-MCNC: 104 MG/DL
WBC # FLD AUTO: 4.68 K/UL

## 2020-06-05 PROCEDURE — 99213 OFFICE O/P EST LOW 20 MIN: CPT

## 2020-06-05 NOTE — PHYSICAL EXAM
[Normal RUE] : Right Upper Extremity: No scars, rashes, lesions, ulcers, skin intact [Normal LUE] : Left Upper Extremity: No scars, rashes, lesions, ulcers, skin intact [Normal Touch] : sensation intact for touch [Normal] : No swelling, no edema, normal pedal pulses and normal temperature [Poor Appearance] : well-appearing [Acute Distress] : not in acute distress [Obese] : not obese [de-identified] : Cervical Spine/Neck\par Inspection/Palpation :\par ¦ Inspection : alignment midline, normal degree of lordosis present\par ¦ Skin : normal appearance, no masses, no tenderness, trachea midline\par ¦ Palpation : bilateral  paraspinal tenderness\par ¦ Tests and Signs : Spurling’s (+) to the right, Lhermitte’s (-)\par ¦ Range of Motion : limited ROM in all directions with marked right sided neck pain with left rotation, no crepitus with ROM\par ¦ Stability : no subluxations or other evidence of instability demonstrated during range of motion testing\par o Muscle Strength : paraspinal muscle strength within normal limits\par o Muscle Tone : paraspinal muscle tone within normal limits\par o Muscle Bulk : normal, no atrophy\par o Cervical Lymph Nodes : no lymphadenopathy present \par \par Right Upper Extremity\par o Shoulder :\par ¦ Inspection/Palpation : tenderness over the greater tuberosity, no swelling, no deformities\par ¦ Range of Motion : ACTIVE FORWARD ELEVATION: Measured at 80 degrees, ACTIVE EXTERNAL ROTATION: Measured at 35 degrees, ACTIVE INTERNAL ROTATION: Measured at GT, ACTIVE ABDUCTION: Measured at 60 degrees\par ¦ Strength : external rotation 5-/5, internal rotation 4+/5, supraspinatus 4+/5 with pain\par ¦ Stability : no joint instability on provocative testing\par ¦ Tests and Signs: Bridges (+), Neer (+)\par o Upper Arm : no tenderness, no swelling, no deformities\par o Muscle Bulk : no atrophy \par o Sensation : sensation intact to light touch \par o Skin : no skin rash or discoloration \par o Vascular Exam : no edema, no cyanosis, radial and ulnar pulses normal , hypervascularity of right hand. \par ¦ Upper Extremity Strength : elbow flexion and extension 5/5, wrist extension, flexion, ulnar deviation and radial deviation 5/5, all intrinsic and extrinsic hand muscles 3-/5,  strength 3/5\par \par \par

## 2020-06-05 NOTE — DISCUSSION/SUMMARY
[de-identified] : The underlying pathophysiology was reviewed in great detail with the patient as well as the various treatment options, including ice, analgesics, NSAIDs, Physical therapy, steroid injections, arthroscopic debridement.\par \par She is to continue with Physical Therapy, a prescription was provided. \par \par A referral was made to Dr. Forman for EMG and evaluation \par \par Requests were sent to workman's compensation. \par \par Workers compensation paperwork was completed in clinic today.\par \par FU 6 weeks.

## 2020-06-05 NOTE — HISTORY OF PRESENT ILLNESS
[de-identified] : Worker’s Compensation Visit:\par 49 year old female presents for an evaluation of right shoulder pain and neck pain. The patient presents with an injury to the right shoulder, neck, and right hand that occurred on 5/25/2018. The patient works as a  at a school and was injured at work while cleaning the toilet when it exploded when she flushed it. After the injury she was taken to the Hospital and had XRays taken in the ED. On 6/2/2018 an MRI of the cervical spine was obtained and revealed progressive multilevel degenerative spondylitic change with asymmetries and cord compression. On 9/7/2018 she underwent a discectomy and discoplasty of C4-5 and C5-6. On 3/11/2019 XRays of the right shoulder were obtained an revealed unremarkable findings, no acute fractures. On 3/11/2019 XRays of the cervical spine were obtained and revealed s/p discectomy and discoplasty of C4-5 and C5-6, no acute fractures, degenerative disc disease throughout the cervical spine.On 3/11/2019 an MRI of the lumbar spine was obtained and revealed left foraminal disc herniation/vertebral spurring at L4-L5 with left foraminal encroachment and impingement of the exiting left L4 nerve root, annular bulge/vertebral spurring at L5-S1 resulting in a thecal sac impingement and bilateral foraminal narrowing with bilateral L5 nerve root impingement, intraosseous discal herniations at T11-T12 and L1-L2, annular bulge at T11-T12 which flattens the ventral thecal sac, injury of the interspinous ligament at L5-S1. An MRI of the right shoulder obtained on 7/1/2019 revealed a linear partial-thickness interstitial delaminating tear, fraying/partial thickness tearing of the superior subscapularis tendon fibers, and mild  AC joint arthrosis. \par \par Since her last visit: The patient reports that she continues to experience a aching pain located along the posterior aspect of her right shoulder that is constant in nature and that radiates up to her neck. She continues to experience frequent headaches. Her symptoms are exacerbated with left rotation of her neck and with certain shoulder rotations, including elevation of her right upper extremity. She notes that her shoulder pain continues to wake her at night. She completed a course of physical therapy prior to the onset of Covid-19 that provided her with good relief in pain, and improvements in strength and ROM.  At her last visit in December, we ordered an EMG of the right upper extremity. Patient states she did not complete the EMG at this time. She has no other complaints at this time. \par \par Test or Referrals: Authorization for physical therapy for the right shoulder and for an EMG study of the right upper extremity.\par Work Status: The patient is currently not working, 100% disabled.\par Prognosis:The patients prognosis for recovery is guarded.

## 2020-06-29 ENCOUNTER — APPOINTMENT (OUTPATIENT)
Dept: ORTHOPEDIC SURGERY | Facility: CLINIC | Age: 50
End: 2020-06-29

## 2020-07-20 ENCOUNTER — APPOINTMENT (OUTPATIENT)
Dept: ORTHOPEDIC SURGERY | Facility: CLINIC | Age: 50
End: 2020-07-20

## 2020-08-06 ENCOUNTER — APPOINTMENT (OUTPATIENT)
Dept: SPINE | Facility: CLINIC | Age: 50
End: 2020-08-06
Payer: OTHER MISCELLANEOUS

## 2020-08-06 VITALS
HEART RATE: 68 BPM | WEIGHT: 170 LBS | DIASTOLIC BLOOD PRESSURE: 45 MMHG | RESPIRATION RATE: 16 BRPM | TEMPERATURE: 98.4 F | HEIGHT: 63 IN | SYSTOLIC BLOOD PRESSURE: 112 MMHG | BODY MASS INDEX: 30.12 KG/M2

## 2020-08-06 PROCEDURE — 99213 OFFICE O/P EST LOW 20 MIN: CPT

## 2020-08-06 NOTE — DATA REVIEWED
[de-identified] : MRI of brain, cervical , thoracic and lumbar from [facility].  5/2020 [de-identified] : Cervical spine xrays from  8/5/2020

## 2020-08-06 NOTE — ASSESSMENT
[FreeTextEntry1] : Two year post ACDF and now with the inability to walk.   A full neuroaxis MRI of cervical, thoracic and lumbar shows no cord compression and no herniated discs.The cause of this patient's back pain and leg pain/weakness remains unclear. There is no structural compression. There is no need for any further surgical intervention. She should followup with her neurologist Dr. Richa Velasquez.

## 2020-08-06 NOTE — PHYSICAL EXAM
[Person] : oriented to person [Time] : oriented to time [Place] : oriented to place [Short Term Intact] : short term memory intact [Remote Intact] : remote memory intact [Span Intact] : the attention span was normal [Concentration Intact] : normal concentrating ability [Comprehension] : comprehension intact [Fluency] : fluency intact [Current Events] : adequate knowledge of current events [Past History] : adequate knowledge of personal past history [Cranial Nerves Optic (II)] : visual acuity intact bilaterally,  pupils equal round and reactive to light [Vocabulary] : adequate range of vocabulary [Cranial Nerves Oculomotor (III)] : extraocular motion intact [Cranial Nerves Trigeminal (V)] : facial sensation intact symmetrically [Cranial Nerves Facial (VII)] : face symmetrical [Cranial Nerves Glossopharyngeal (IX)] : tongue and palate midline [Cranial Nerves Vestibulocochlear (VIII)] : hearing was intact bilaterally [Cranial Nerves Accessory (XI - Cranial And Spinal)] : head turning and shoulder shrug symmetric [Motor Tone] : muscle tone was normal in all four extremities [Cranial Nerves Hypoglossal (XII)] : there was no tongue deviation with protrusion [No Muscle Atrophy] : normal bulk in all four extremities [Sensation Tactile Decrease] : light touch was intact [3+] : Ankle jerk left 3+ [No Tenderness to Palpation] : no spine tenderness on palpation [Past-pointing] : there was no past-pointing [Tremor] : no tremor present [Plantar Reflex Right Only] : normal on the right [Plantar Reflex Left Only] : normal on the left [Robert] : Robert's sign was not demonstrated [FreeTextEntry6] : at least 4/5 strength throughout with poor exertion [FreeTextEntry8] : in a wheelchair [L'Hermitte's] : neck flexion did not produce tingling down the spine/arms [Spurling's - Opposite Side] : Negative Spurling's on opposite side [Spurling's Same Side] : Negative Spurling's on same side [Straight-Leg Raise Test - Left] : straight leg raise of the left leg was negative [Straight-Leg Raise Test - Right] : straight leg raise  of the right leg was negative

## 2020-08-06 NOTE — REASON FOR VISIT
[Follow-Up: _____] : a [unfilled] follow-up visit [FreeTextEntry1] : 50 year old female two years post op from an ACDF.    She has been seen in the office a year ago for neck and back pain and she did have  a work related injury on 5/25/2018.   She presents today after being admitted to an OSH for seizures and HA.  At the time of the admission in June 2020 she began to have difficulty walking  .  A full neuroaxis work up was complete in [facility].  \par Today she reports the inability to walk and weakness.   She reports neck and back pain.  Recently she had an evaluation with Dr Basil Velasquez.  MRI scanning of the brain, cervical spine, thoracic spine and lumbar spine did not show any structural impingement of the brain or spinal cord. There is no significant disc herniation or stenosis. Recent flexion-extension x-rays show a solid arthrodesis at the area of her previous cervical spine surgery.

## 2020-08-11 ENCOUNTER — APPOINTMENT (OUTPATIENT)
Dept: ORTHOPEDIC SURGERY | Facility: CLINIC | Age: 50
End: 2020-08-11
Payer: OTHER MISCELLANEOUS

## 2020-08-11 PROCEDURE — 99214 OFFICE O/P EST MOD 30 MIN: CPT

## 2020-08-11 NOTE — DISCUSSION/SUMMARY
[de-identified] : The underlying pathophysiology was reviewed in great detail with the patient as well as the various treatment options, including ice, analgesics, NSAIDs, Physical therapy, steroid injections, arthroscopic debridement.\par \par She is to continue with Physical Therapy, a prescription was provided. \par \par A referral was made for EMG. \par \par Requests were sent to workman's compensation. \par \par FU 6 weeks.\par \par All questions were answered, all alternatives discussed and the patient is in complete agreement with that plan. Follow-up appointment as instructed. Any issues and the patient will call or come in sooner.

## 2020-08-11 NOTE — PHYSICAL EXAM
[Normal LUE] : Left Upper Extremity: No scars, rashes, lesions, ulcers, skin intact [Normal RUE] : Right Upper Extremity: No scars, rashes, lesions, ulcers, skin intact [Normal Touch] : sensation intact for touch [Normal] : No swelling, no edema, normal pedal pulses and normal temperature [Poor Appearance] : well-appearing [Acute Distress] : not in acute distress [Obese] : not obese [de-identified] : Cervical Spine/Neck\par Inspection/Palpation :\par ¦ Inspection : alignment midline, normal degree of lordosis present\par ¦ Skin : normal appearance, no masses, no tenderness, trachea midline\par ¦ Palpation : bilateral  paraspinal tenderness\par ¦ Tests and Signs : Spurling’s (+) to the right, Lhermitte’s (-)\par ¦ Range of Motion : limited ROM in all directions with marked right sided neck pain with left rotation, no crepitus with ROM\par ¦ Stability : no subluxations or other evidence of instability demonstrated during range of motion testing\par o Muscle Strength : paraspinal muscle strength within normal limits\par o Muscle Tone : paraspinal muscle tone within normal limits\par o Muscle Bulk : normal, no atrophy\par o Cervical Lymph Nodes : no lymphadenopathy present \par \par Right Upper Extremity\par o Shoulder :\par ¦ Inspection/Palpation : tenderness over the greater tuberosity, no swelling, no deformities\par ¦ Range of Motion : ACTIVE FORWARD ELEVATION: Measured at 60 degrees, ACTIVE EXTERNAL ROTATION: Measured at 45 degrees, ACTIVE INTERNAL ROTATION: Measured at GT, ACTIVE ABDUCTION: Measured at 60 degrees\par ¦ Strength : external rotation 3/5, internal rotation 4/5, supraspinatus 2/5 with pain\par ¦ Stability : no joint instability on provocative testing\par ¦ Tests and Signs: Bridges (+), Neer (+)\par o Upper Arm : no tenderness, no swelling, no deformities\par o Muscle Bulk : no atrophy \par o Sensation : sensation intact to light touch \par o Skin : no skin rash or discoloration \par o Vascular Exam : no edema, no cyanosis, radial and ulnar pulses normal , hypervascularity of right hand. \par ¦ Upper Extremity Strength : elbow flexion 4-/5 and extension 4/5, wrist extension 3/5, flexion 3/5,  all intrinsic and extrinsic hand muscles 3-/5,  strength 3/5 Ankit’s Reflex (-)\par \par Left Upper Extremity\par o Shoulder :\par ¦ Inspection/Palpation : tenderness over the greater tuberosity, no swelling, no deformities\par ¦ Range of Motion : ACTIVE FORWARD ELEVATION: Measured at 150 degrees, ACTIVE EXTERNAL ROTATION: Measured at 60 degrees, ACTIVE INTERNAL ROTATION: Measured at T12, ACTIVE ABDUCTION: Measured at 125 degrees\par ¦ Strength : external rotation 5/5, internal rotation 5/5, supraspinatus 5/5 \par ¦ Stability : no joint instability on provocative testing\par ¦ Tests and Signs: Bridges (+), Neer (+)\par o Upper Arm : no tenderness, no swelling, no deformities\par o Muscle Bulk : no atrophy \par o Sensation : sensation intact to light touch \par o Skin : no skin rash or discoloration \par o Vascular Exam : no edema, no cyanosis, radial and ulnar pulses normal , hypervascularity of right hand. \par ¦ Upper Extremity Strength : elbow flexion and extension 5/5, wrist extension, flexion, ulnar deviation and radial deviation 5/5, all intrinsic and extrinsic hand muscles 5/5,  strength 3/5

## 2020-08-11 NOTE — HISTORY OF PRESENT ILLNESS
[de-identified] : Worker’s Compensation Visit:\par 50 year old female presents for an evaluation of right shoulder pain and neck pain. The patient presents with an injury to the right shoulder, neck, and right hand that occurred on 5/25/2018. The patient works as a  at a school and was injured at work while cleaning the toilet when it exploded when she flushed it. After the injury she was taken to the Hospital and had XRays taken in the ED. On 6/2/2018 an MRI of the cervical spine was obtained and revealed progressive multilevel degenerative spondylitic change with asymmetries and cord compression. On 9/7/2018 she underwent a discectomy and discoplasty of C4-5 and C5-6. On 3/11/2019 XRays of the right shoulder were obtained an revealed unremarkable findings, no acute fractures. On 3/11/2019 XRays of the cervical spine were obtained and revealed s/p discectomy and discoplasty of C4-5 and C5-6, no acute fractures, degenerative disc disease throughout the cervical spine.On 3/11/2019 an MRI of the lumbar spine was obtained and revealed left foraminal disc herniation/vertebral spurring at L4-L5 with left foraminal encroachment and impingement of the exiting left L4 nerve root, annular bulge/vertebral spurring at L5-S1 resulting in a thecal sac impingement and bilateral foraminal narrowing with bilateral L5 nerve root impingement, intraosseous discal herniations at T11-T12 and L1-L2, annular bulge at T11-T12 which flattens the ventral thecal sac, injury of the interspinous ligament at L5-S1. An MRI of the right shoulder obtained on 7/1/2019 revealed a linear partial-thickness interstitial delaminating tear, fraying/partial thickness tearing of the superior subscapularis tendon fibers, and mild  AC joint arthrosis. \par \par Since her last visit: She presents today in a wheel chair after being admitted to an OSH for 24 days.  She reports that she was having headaches and then suffered a seizure.  At the time of the admission in June 2020 she began to have difficulty walking and now has a right sided hemiparesis. A full neuroaxis work up was complete in [facility]. Today she reports the inability to walk and weakness. She reports neck and back pain. Recently she had an evaluation with Dr Basil Velasquez. MRI scanning of the brain, cervical spine, thoracic spine and lumbar spine did not show any structural impingement of the brain or spinal cord. There is no significant disc herniation or stenosis. Recent flexion-extension x-rays show a solid arthrodesis at the area of her previous cervical spine surgery. Patient is currently under the care of Dr. Montgomery( neurology) and Dr. Chaudhry. \par \par Test or Referrals: Authorization for physical therapy for the right shoulder and for an EMG study of the right upper extremity.\par Work Status: The patient is currently not working, 100% disabled.\par Prognosis:The patients prognosis for recovery is guarded.

## 2020-08-13 ENCOUNTER — APPOINTMENT (OUTPATIENT)
Dept: FAMILY MEDICINE | Facility: CLINIC | Age: 50
End: 2020-08-13
Payer: MEDICAID

## 2020-08-13 VITALS
OXYGEN SATURATION: 98 % | SYSTOLIC BLOOD PRESSURE: 110 MMHG | HEART RATE: 99 BPM | DIASTOLIC BLOOD PRESSURE: 80 MMHG | HEIGHT: 63 IN

## 2020-08-13 LAB
25(OH)D3 SERPL-MCNC: 34.5 NG/ML
ALBUMIN SERPL ELPH-MCNC: 4.6 G/DL
ALP BLD-CCNC: 107 U/L
ALT SERPL-CCNC: 23 U/L
ANION GAP SERPL CALC-SCNC: 12 MMOL/L
AST SERPL-CCNC: 18 U/L
BASOPHILS # BLD AUTO: 0.03 K/UL
BASOPHILS NFR BLD AUTO: 0.5 %
BILIRUB SERPL-MCNC: 0.3 MG/DL
BUN SERPL-MCNC: 10 MG/DL
CALCIUM SERPL-MCNC: 9.5 MG/DL
CARBAMAZEPINE SERPL-MCNC: <2 UG/ML
CHLORIDE SERPL-SCNC: 104 MMOL/L
CHOLEST SERPL-MCNC: 212 MG/DL
CHOLEST/HDLC SERPL: 3.2 RATIO
CO2 SERPL-SCNC: 26 MMOL/L
CREAT SERPL-MCNC: 0.74 MG/DL
EOSINOPHIL # BLD AUTO: 0.13 K/UL
EOSINOPHIL NFR BLD AUTO: 2.2 %
GLUCOSE SERPL-MCNC: 96 MG/DL
HCT VFR BLD CALC: 42.5 %
HDLC SERPL-MCNC: 66 MG/DL
HGB BLD-MCNC: 13.7 G/DL
IMM GRANULOCYTES NFR BLD AUTO: 0.2 %
LDLC SERPL CALC-MCNC: 120 MG/DL
LYMPHOCYTES # BLD AUTO: 1.87 K/UL
LYMPHOCYTES NFR BLD AUTO: 31.4 %
MAN DIFF?: NORMAL
MCHC RBC-ENTMCNC: 30.6 PG
MCHC RBC-ENTMCNC: 32.2 GM/DL
MCV RBC AUTO: 95.1 FL
MONOCYTES # BLD AUTO: 0.39 K/UL
MONOCYTES NFR BLD AUTO: 6.6 %
NEUTROPHILS # BLD AUTO: 3.52 K/UL
NEUTROPHILS NFR BLD AUTO: 59.1 %
PLATELET # BLD AUTO: 190 K/UL
POTASSIUM SERPL-SCNC: 4.3 MMOL/L
PROT SERPL-MCNC: 6.9 G/DL
RBC # BLD: 4.47 M/UL
RBC # FLD: 13.1 %
SODIUM SERPL-SCNC: 142 MMOL/L
TRIGL SERPL-MCNC: 128 MG/DL
WBC # FLD AUTO: 5.95 K/UL

## 2020-08-13 PROCEDURE — 36415 COLL VENOUS BLD VENIPUNCTURE: CPT

## 2020-08-13 PROCEDURE — 99215 OFFICE O/P EST HI 40 MIN: CPT | Mod: 25

## 2020-08-13 RX ORDER — ACETAMINOPHEN EXTRA STRENGTH 500 MG/1
500 TABLET ORAL
Qty: 60 | Refills: 0 | Status: COMPLETED | COMMUNITY
Start: 2020-05-19

## 2020-08-13 RX ORDER — LIDOCAINE AND PRILOCAINE 25; 25 MG/G; MG/G
2.5-2.5 CREAM TOPICAL
Qty: 60 | Refills: 0 | Status: COMPLETED | COMMUNITY
Start: 2020-08-03

## 2020-08-13 RX ORDER — DICLOFENAC SODIUM 75 MG/1
75 TABLET, DELAYED RELEASE ORAL
Qty: 60 | Refills: 0 | Status: DISCONTINUED | COMMUNITY
Start: 2018-09-20 | End: 2020-08-13

## 2020-08-13 RX ORDER — LIDOCAINE 4% 4 G/100G
4 CREAM TOPICAL
Qty: 30 | Refills: 0 | Status: COMPLETED | COMMUNITY
Start: 2020-02-10

## 2020-08-13 RX ORDER — ISOPROPYL ALCOHOL 0.7 ML/ML
SWAB TOPICAL
Qty: 100 | Refills: 0 | Status: COMPLETED | COMMUNITY
Start: 2020-08-03

## 2020-08-13 RX ORDER — ESCITALOPRAM OXALATE 10 MG/1
10 TABLET ORAL DAILY
Qty: 30 | Refills: 3 | Status: DISCONTINUED | COMMUNITY
Start: 2020-01-10 | End: 2020-08-13

## 2020-08-13 RX ORDER — BETAMETHASONE DIPROPIONATE 0.5 MG/G
0.05 CREAM TOPICAL
Qty: 120 | Refills: 0 | Status: COMPLETED | COMMUNITY
Start: 2020-08-03

## 2020-08-13 RX ORDER — SIMETHICONE 180 MG
180 CAPSULE ORAL 4 TIMES DAILY
Qty: 60 | Refills: 0 | Status: DISCONTINUED | COMMUNITY
Start: 2019-01-28 | End: 2020-08-13

## 2020-08-13 RX ORDER — PRAVASTATIN SODIUM 10 MG/1
10 TABLET ORAL
Qty: 90 | Refills: 1 | Status: DISCONTINUED | COMMUNITY
Start: 2020-06-05 | End: 2020-08-13

## 2020-08-13 RX ORDER — GABAPENTIN 300 MG/1
300 CAPSULE ORAL
Qty: 90 | Refills: 2 | Status: DISCONTINUED | COMMUNITY
Start: 2018-07-19 | End: 2020-08-13

## 2020-08-13 RX ORDER — CLOTRIMAZOLE 10 MG/G
1 CREAM TOPICAL 3 TIMES DAILY
Qty: 1 | Refills: 0 | Status: DISCONTINUED | COMMUNITY
Start: 2019-02-25 | End: 2020-08-13

## 2020-08-13 RX ORDER — DULOXETINE HYDROCHLORIDE 20 MG/1
20 CAPSULE, DELAYED RELEASE PELLETS ORAL
Qty: 30 | Refills: 0 | Status: COMPLETED | COMMUNITY
Start: 2020-07-08

## 2020-08-13 RX ORDER — MULTIVIT-MIN/IRON FUM/FOLIC AC 7.5 MG-4
TABLET ORAL
Qty: 30 | Refills: 0 | Status: COMPLETED | COMMUNITY
Start: 2020-03-11

## 2020-08-13 RX ORDER — GABAPENTIN 400 MG/1
400 CAPSULE ORAL
Qty: 90 | Refills: 0 | Status: COMPLETED | COMMUNITY
Start: 2020-04-20

## 2020-08-13 NOTE — REVIEW OF SYSTEMS
[Unsteady Walking] : ataxia [Fatigue] : fatigue [Depression] : depression [Negative] : Cardiovascular [Anxiety] : anxiety

## 2020-08-13 NOTE — PHYSICAL EXAM
[No Acute Distress] : no acute distress [Normal Sclera/Conjunctiva] : normal sclera/conjunctiva [Well Nourished] : well nourished [EOMI] : extraocular movements intact [Normal Outer Ear/Nose] : the outer ears and nose were normal in appearance [Normal] : normal rate, regular rhythm, normal S1 and S2 and no murmur heard [No JVD] : no jugular venous distention [No Edema] : there was no peripheral edema [Alert and Oriented x3] : oriented to person, place, and time [Normal Insight/Judgement] : insight and judgment were intact [de-identified] : difficult to palpate pedal pulses [de-identified] : generalized weakness, in w/c [de-identified] : sad

## 2020-08-13 NOTE — HISTORY OF PRESENT ILLNESS
[Discharged on ___] : discharged on [unfilled] [Post-hospitalization from ___ Hospital] : Post-hospitalization from [unfilled] Hospital [Discharge Summary] : discharge summary [Radiology Findings] : radiology findings [Discharge Med List] : discharge medication list [Med Reconciliation] : medication reconciliation has been completed [FreeTextEntry2] : pt was admitted w/ presumed seizures,  leg weakness, hx of cervical fusion, cervical and lumbar radiculopathy, now w/ chronic rt dvt, acute left dvt, now on eliquis and switched to lipitor, does not have vascular f/u\par on carbamezepine, states makes her feel unwell, does not want to take, will discuss w/ neuro\par difficulty walking, getting home PT and RN visits

## 2020-08-19 ENCOUNTER — APPOINTMENT (OUTPATIENT)
Dept: VASCULAR SURGERY | Facility: CLINIC | Age: 50
End: 2020-08-19
Payer: MEDICAID

## 2020-08-19 VITALS
TEMPERATURE: 98.6 F | WEIGHT: 172 LBS | DIASTOLIC BLOOD PRESSURE: 78 MMHG | SYSTOLIC BLOOD PRESSURE: 111 MMHG | HEIGHT: 63 IN | BODY MASS INDEX: 30.48 KG/M2 | HEART RATE: 90 BPM

## 2020-08-19 PROCEDURE — 93970 EXTREMITY STUDY: CPT

## 2020-08-19 PROCEDURE — 99202 OFFICE O/P NEW SF 15 MIN: CPT

## 2020-09-22 ENCOUNTER — APPOINTMENT (OUTPATIENT)
Dept: ORTHOPEDIC SURGERY | Facility: CLINIC | Age: 50
End: 2020-09-22

## 2020-09-30 ENCOUNTER — APPOINTMENT (OUTPATIENT)
Dept: VASCULAR SURGERY | Facility: CLINIC | Age: 50
End: 2020-09-30

## 2020-10-06 ENCOUNTER — APPOINTMENT (OUTPATIENT)
Dept: ORTHOPEDIC SURGERY | Facility: CLINIC | Age: 50
End: 2020-10-06
Payer: OTHER MISCELLANEOUS

## 2020-10-06 VITALS — WEIGHT: 180 LBS | HEIGHT: 63 IN | BODY MASS INDEX: 31.89 KG/M2

## 2020-10-06 PROCEDURE — 99213 OFFICE O/P EST LOW 20 MIN: CPT

## 2020-10-06 NOTE — PHYSICAL EXAM
[Normal RUE] : Right Upper Extremity: No scars, rashes, lesions, ulcers, skin intact [Normal LUE] : Left Upper Extremity: No scars, rashes, lesions, ulcers, skin intact [Normal Touch] : sensation intact for touch [Normal] : No swelling, no edema, normal pedal pulses and normal temperature [Poor Appearance] : well-appearing [Acute Distress] : not in acute distress [Obese] : not obese [de-identified] : Cervical Spine/Neck\par Inspection/Palpation :\par ¦ Inspection : alignment midline, normal degree of lordosis present\par ¦ Skin : normal appearance, no masses, no tenderness, trachea midline\par ¦ Palpation : bilateral  paraspinal tenderness\par ¦ Tests and Signs : Spurling’s (+) to the right, Lhermitte’s (-)\par ¦ Range of Motion : limited ROM in all directions with marked right sided neck pain with left rotation, no crepitus with ROM\par ¦ Stability : no subluxations or other evidence of instability demonstrated during range of motion testing\par o Muscle Strength : paraspinal muscle strength within normal limits\par o Muscle Tone : paraspinal muscle tone within normal limits\par o Muscle Bulk : normal, no atrophy\par o Cervical Lymph Nodes : no lymphadenopathy present \par \par Right Upper Extremity\par o Shoulder :\par ¦ Inspection/Palpation : tenderness over the greater tuberosity, no swelling, no deformities\par ¦ Range of Motion : ACTIVE FORWARD ELEVATION: Measured at 90 degrees, ACTIVE EXTERNAL ROTATION: Measured at 70 degrees, ACTIVE INTERNAL ROTATION: Measured at PSIS, ACTIVE ABDUCTION: Measured at 10 degrees\par ¦ Strength : external rotation 3/5, internal rotation 4/5, supraspinatus 3/5 with pain\par ¦ Stability : no joint instability on provocative testing\par ¦ Tests and Signs: Bridges (+), Neer (+)\par o Upper Arm : no tenderness, no swelling, no deformities\par o Muscle Bulk : no atrophy \par o Sensation : sensation intact to light touch \par o Skin : no skin rash or discoloration \par o Vascular Exam : no edema, no cyanosis, radial and ulnar pulses normal , hypervascularity of right hand. \par ¦ Upper Extremity Strength : elbow flexion 4-/5 and extension 4/5, wrist extension 3/5, flexion 3/5,  all intrinsic and extrinsic hand muscles 3/5,  strength 3/5 Ankit’s Reflex (-)\par \par Left Upper Extremity\par o Shoulder :\par ¦ Inspection/Palpation : tenderness over the greater tuberosity, no swelling, no deformities\par ¦ Range of Motion : ACTIVE FORWARD ELEVATION: Measured at 150 degrees, ACTIVE EXTERNAL ROTATION: Measured at 60 degrees, ACTIVE INTERNAL ROTATION: Measured at T12, ACTIVE ABDUCTION: Measured at 125 degrees\par ¦ Strength : external rotation 5/5, internal rotation 5/5, supraspinatus 5/5 \par ¦ Stability : no joint instability on provocative testing\par ¦ Tests and Signs: Bridges (+), Neer (+)\par o Upper Arm : no tenderness, no swelling, no deformities\par o Muscle Bulk : no atrophy \par o Sensation : sensation intact to light touch \par o Skin : no skin rash or discoloration \par o Vascular Exam : no edema, no cyanosis, radial and ulnar pulses normal , hypervascularity of right hand. \par ¦ Upper Extremity Strength : elbow flexion and extension 5/5, wrist extension, flexion, ulnar deviation and radial deviation 5/5, all intrinsic and extrinsic hand muscles 5/5,  strength 3/5

## 2020-10-06 NOTE — DISCUSSION/SUMMARY
[de-identified] : The underlying pathophysiology was reviewed in great detail with the patient as well as the various treatment options, including ice, analgesics, NSAIDs, Physical therapy, steroid injections, arthroscopic debridement.\par \par She is to continue with Physical Therapy, a prescription was provided. A request was sent to workers compensation. \par \par A referral was made for EMG.  A request was sent to workers compensation. \par \par FU 6 weeks.\par \par All questions were answered, all alternatives discussed and the patient is in complete agreement with that plan. Follow-up appointment as instructed. Any issues and the patient will call or come in sooner.

## 2020-10-06 NOTE — HISTORY OF PRESENT ILLNESS
[de-identified] : Worker’s Compensation Visit:\par 50 year old female presents for an evaluation of right shoulder pain and neck pain. The patient presents with an injury to the right shoulder, neck, and right hand that occurred on 5/25/2018. The patient works as a  at a school and was injured at work while cleaning the toilet when it exploded when she flushed it. After the injury she was taken to the Hospital and had XRays taken in the ED. On 6/2/2018 an MRI of the cervical spine was obtained and revealed progressive multilevel degenerative spondylitic change with asymmetries and cord compression. On 9/7/2018 she underwent a discectomy and discoplasty of C4-5 and C5-6. On 3/11/2019 XRays of the right shoulder were obtained an revealed unremarkable findings, no acute fractures. On 3/11/2019 XRays of the cervical spine were obtained and revealed s/p discectomy and discoplasty of C4-5 and C5-6, no acute fractures, degenerative disc disease throughout the cervical spine.On 3/11/2019 an MRI of the lumbar spine was obtained and revealed left foraminal disc herniation/vertebral spurring at L4-L5 with left foraminal encroachment and impingement of the exiting left L4 nerve root, annular bulge/vertebral spurring at L5-S1 resulting in a thecal sac impingement and bilateral foraminal narrowing with bilateral L5 nerve root impingement, intraosseous discal herniations at T11-T12 and L1-L2, annular bulge at T11-T12 which flattens the ventral thecal sac, injury of the interspinous ligament at L5-S1. An MRI of the right shoulder obtained on 7/1/2019 revealed a linear partial-thickness interstitial delaminating tear, fraying/partial thickness tearing of the superior subscapularis tendon fibers, and mild  AC joint arthrosis. \par \par Since her last visit: She presents today in a wheel chair again with her  who is contributing to her medical history. in July 2020 She was admitted to an OSH for 24 days.  She reports that she was having headaches and then suffered a seizure.  At the time of the admission in June 2020 she began to have difficulty walking and now has a right sided hemiparesis. A full neuroaxis work up was complete in [facility]. Today she reports the continued inability to walk and weakness. She reports neck and back pain. Recently she had an evaluation with Dr Basil Velasquez. MRI scanning of the brain, cervical spine, thoracic spine and lumbar spine did not show any structural impingement of the brain or spinal cord. There is no significant disc herniation or stenosis. Recent flexion-extension x-rays show a solid arthrodesis at the area of her previous cervical spine surgery. Patient is currently under the care of Dr. Montgomery( neurology) and Dr. Chaudhry. \par \par Test or Referrals: Authorization for physical therapy for the right shoulder and for an EMG study of the right upper extremity.\par Work Status: The patient is currently not working, 100% disabled.\par Prognosis:The patients prognosis for recovery is guarded.

## 2020-11-05 ENCOUNTER — APPOINTMENT (OUTPATIENT)
Dept: FAMILY MEDICINE | Facility: CLINIC | Age: 50
End: 2020-11-05
Payer: MEDICAID

## 2020-11-05 VITALS — SYSTOLIC BLOOD PRESSURE: 124 MMHG | OXYGEN SATURATION: 98 % | HEART RATE: 93 BPM | DIASTOLIC BLOOD PRESSURE: 72 MMHG

## 2020-11-05 DIAGNOSIS — Z09 ENCOUNTER FOR FOLLOW-UP EXAMINATION AFTER COMPLETED TREATMENT FOR CONDITIONS OTHER THAN MALIGNANT NEOPLASM: ICD-10-CM

## 2020-11-05 PROCEDURE — 99072 ADDL SUPL MATRL&STAF TM PHE: CPT

## 2020-11-05 PROCEDURE — 36415 COLL VENOUS BLD VENIPUNCTURE: CPT

## 2020-11-05 PROCEDURE — 99214 OFFICE O/P EST MOD 30 MIN: CPT | Mod: 25

## 2020-11-05 RX ORDER — APIXABAN 5 MG/1
5 TABLET, FILM COATED ORAL
Qty: 180 | Refills: 0 | Status: DISCONTINUED | COMMUNITY
Start: 2020-08-13 | End: 2020-11-05

## 2020-11-05 RX ORDER — WALKER
EACH MISCELLANEOUS
Qty: 1 | Refills: 0 | Status: ACTIVE | OUTPATIENT
Start: 2020-11-05

## 2020-11-05 RX ORDER — CARBAMAZEPINE 200 MG/1
200 TABLET ORAL
Qty: 60 | Refills: 0 | Status: DISCONTINUED | COMMUNITY
Start: 2020-07-27 | End: 2020-11-05

## 2020-11-05 NOTE — REVIEW OF SYSTEMS
[Joint Pain] : joint pain [Joint Stiffness] : joint stiffness [Back Pain] : back pain [Negative] : Psychiatric

## 2020-11-05 NOTE — HISTORY OF PRESENT ILLNESS
[FreeTextEntry1] : ran out of atorvastatin last wk\par off eliquis, tegretol\par restarted flexeril, helping her sleep\par feeling somewhat better, though still dealing w/ chronic pain

## 2020-11-05 NOTE — PHYSICAL EXAM
[No Acute Distress] : no acute distress [Well Nourished] : well nourished [Normal Sclera/Conjunctiva] : normal sclera/conjunctiva [EOMI] : extraocular movements intact [Normal Outer Ear/Nose] : the outer ears and nose were normal in appearance [No JVD] : no jugular venous distention [No Respiratory Distress] : no respiratory distress  [Normal] : normal rate, regular rhythm, normal S1 and S2 and no murmur heard [No Edema] : there was no peripheral edema [No Extremity Clubbing/Cyanosis] : no extremity clubbing/cyanosis [Coordination Grossly Intact] : coordination grossly intact [Normal Affect] : the affect was normal [Alert and Oriented x3] : oriented to person, place, and time [Normal Insight/Judgement] : insight and judgment were intact

## 2020-11-07 LAB
ALBUMIN SERPL ELPH-MCNC: 4.8 G/DL
ALP BLD-CCNC: 107 U/L
ALT SERPL-CCNC: 16 U/L
ANION GAP SERPL CALC-SCNC: 10 MMOL/L
AST SERPL-CCNC: 19 U/L
BASOPHILS # BLD AUTO: 0.03 K/UL
BASOPHILS NFR BLD AUTO: 0.7 %
BILIRUB SERPL-MCNC: 0.8 MG/DL
BUN SERPL-MCNC: 10 MG/DL
CALCIUM SERPL-MCNC: 9.9 MG/DL
CHLORIDE SERPL-SCNC: 102 MMOL/L
CHOLEST SERPL-MCNC: 242 MG/DL
CO2 SERPL-SCNC: 28 MMOL/L
CREAT SERPL-MCNC: 0.84 MG/DL
EOSINOPHIL # BLD AUTO: 0.09 K/UL
EOSINOPHIL NFR BLD AUTO: 2 %
ESTIMATED AVERAGE GLUCOSE: 103 MG/DL
GLUCOSE SERPL-MCNC: 84 MG/DL
HBA1C MFR BLD HPLC: 5.2 %
HCT VFR BLD CALC: 42 %
HDLC SERPL-MCNC: 68 MG/DL
HGB BLD-MCNC: 13.3 G/DL
IMM GRANULOCYTES NFR BLD AUTO: 0.2 %
LDLC SERPL CALC-MCNC: 160 MG/DL
LYMPHOCYTES # BLD AUTO: 1.87 K/UL
LYMPHOCYTES NFR BLD AUTO: 41.7 %
MAN DIFF?: NORMAL
MCHC RBC-ENTMCNC: 30.2 PG
MCHC RBC-ENTMCNC: 31.7 GM/DL
MCV RBC AUTO: 95.2 FL
MONOCYTES # BLD AUTO: 0.28 K/UL
MONOCYTES NFR BLD AUTO: 6.3 %
NEUTROPHILS # BLD AUTO: 2.2 K/UL
NEUTROPHILS NFR BLD AUTO: 49.1 %
NONHDLC SERPL-MCNC: 173 MG/DL
PLATELET # BLD AUTO: 173 K/UL
POTASSIUM SERPL-SCNC: 4.8 MMOL/L
PROT SERPL-MCNC: 7.1 G/DL
RBC # BLD: 4.41 M/UL
RBC # FLD: 13.9 %
SARS-COV-2 IGG SERPL IA-ACNC: 158 INDEX
SARS-COV-2 IGG SERPL QL IA: POSITIVE
SODIUM SERPL-SCNC: 140 MMOL/L
TRIGL SERPL-MCNC: 68 MG/DL
WBC # FLD AUTO: 4.48 K/UL

## 2020-11-17 ENCOUNTER — APPOINTMENT (OUTPATIENT)
Dept: ORTHOPEDIC SURGERY | Facility: CLINIC | Age: 50
End: 2020-11-17

## 2021-01-12 ENCOUNTER — APPOINTMENT (OUTPATIENT)
Dept: ORTHOPEDIC SURGERY | Facility: CLINIC | Age: 51
End: 2021-01-12
Payer: OTHER MISCELLANEOUS

## 2021-01-12 VITALS
HEIGHT: 63 IN | HEART RATE: 59 BPM | DIASTOLIC BLOOD PRESSURE: 79 MMHG | BODY MASS INDEX: 30.65 KG/M2 | WEIGHT: 173 LBS | SYSTOLIC BLOOD PRESSURE: 122 MMHG

## 2021-01-12 DIAGNOSIS — M25.511 PAIN IN RIGHT SHOULDER: ICD-10-CM

## 2021-01-12 DIAGNOSIS — M75.41 IMPINGEMENT SYNDROME OF RIGHT SHOULDER: ICD-10-CM

## 2021-01-12 DIAGNOSIS — G89.29 PAIN IN RIGHT SHOULDER: ICD-10-CM

## 2021-01-12 PROCEDURE — 99072 ADDL SUPL MATRL&STAF TM PHE: CPT

## 2021-01-12 PROCEDURE — 99213 OFFICE O/P EST LOW 20 MIN: CPT

## 2021-01-12 NOTE — PHYSICAL EXAM
[Normal RUE] : Right Upper Extremity: No scars, rashes, lesions, ulcers, skin intact [Normal LUE] : Left Upper Extremity: No scars, rashes, lesions, ulcers, skin intact [Normal Touch] : sensation intact for touch [Normal] : No swelling, no edema, normal pedal pulses and normal temperature [Poor Appearance] : well-appearing [Acute Distress] : not in acute distress [Obese] : not obese [de-identified] : Cervical Spine/Neck\par Inspection/Palpation :\par ¦ Inspection : alignment midline, normal degree of lordosis present\par ¦ Skin : normal appearance, no masses, no tenderness, trachea midline\par ¦ Palpation : bilateral  paraspinal tenderness\par ¦ Tests and Signs : Spurling’s (+) to the right, Lhermitte’s (-)\par ¦ Range of Motion : limited ROM in all directions with marked right sided neck pain with left rotation, no crepitus with ROM\par ¦ Stability : no subluxations or other evidence of instability demonstrated during range of motion testing\par o Muscle Strength : paraspinal muscle strength within normal limits\par o Muscle Tone : paraspinal muscle tone within normal limits\par o Muscle Bulk : normal, no atrophy\par o Cervical Lymph Nodes : no lymphadenopathy present \par \par Right Upper Extremity\par o Shoulder :\par ¦ Inspection/Palpation : tenderness over the greater tuberosity, no swelling, no deformities\par ¦ Range of Motion : ACTIVE FORWARD ELEVATION: Measured at 90 degrees, ACTIVE EXTERNAL ROTATION: Measured at 70 degrees, ACTIVE INTERNAL ROTATION: Measured at PSIS, ACTIVE ABDUCTION: Measured at 10 degrees\par ¦ Strength : external rotation 3/5, internal rotation 4/5, supraspinatus 3/5 with pain\par ¦ Stability : no joint instability on provocative testing\par ¦ Tests and Signs: Bridges (+), Neer (+)\par o Upper Arm : no tenderness, no swelling, no deformities\par o Muscle Bulk : no atrophy \par o Sensation : sensation intact to light touch \par o Skin : no skin rash or discoloration \par o Vascular Exam : no edema, no cyanosis, radial and ulnar pulses normal , hypervascularity of right hand. \par ¦ Upper Extremity Strength : elbow flexion 4-/5 and extension 4/5, wrist extension 3/5, flexion 3/5,  all intrinsic and extrinsic hand muscles 3/5,  strength 3/5 Ankit’s Reflex (-)\par \par Left Upper Extremity\par o Shoulder :\par ¦ Inspection/Palpation : tenderness over the greater tuberosity, no swelling, no deformities\par ¦ Range of Motion : ACTIVE FORWARD ELEVATION: Measured at 150 degrees, ACTIVE EXTERNAL ROTATION: Measured at 60 degrees, ACTIVE INTERNAL ROTATION: Measured at T12, ACTIVE ABDUCTION: Measured at 125 degrees\par ¦ Strength : external rotation 5/5, internal rotation 5/5, supraspinatus 5/5 \par ¦ Stability : no joint instability on provocative testing\par ¦ Tests and Signs: Bridges (+), Neer (+)\par o Upper Arm : no tenderness, no swelling, no deformities\par o Muscle Bulk : no atrophy \par o Sensation : sensation intact to light touch \par o Skin : no skin rash or discoloration \par o Vascular Exam : no edema, no cyanosis, radial and ulnar pulses normal , hypervascularity of right hand. \par ¦ Upper Extremity Strength : elbow flexion and extension 5/5, wrist extension, flexion, ulnar deviation and radial deviation 5/5, all intrinsic and extrinsic hand muscles 5/5,  strength 3/5

## 2021-01-12 NOTE — HISTORY OF PRESENT ILLNESS
[de-identified] : Worker’s Compensation Visit:\par 50 year old female presents for an evaluation of right shoulder pain and neck pain. The patient presents with an injury to the right shoulder, neck, and right hand that occurred on 5/25/2018. The patient works as a  at a school and was injured at work while cleaning the toilet when it exploded when she flushed it. After the injury she was taken to the Hospital and had XRays taken in the ED. On 6/2/2018 an MRI of the cervical spine was obtained and revealed progressive multilevel degenerative spondylitic change with asymmetries and cord compression. On 9/7/2018 she underwent a discectomy and discoplasty of C4-5 and C5-6. On 3/11/2019 XRays of the right shoulder were obtained an revealed unremarkable findings, no acute fractures. On 3/11/2019 XRays of the cervical spine were obtained and revealed s/p discectomy and discoplasty of C4-5 and C5-6, no acute fractures, degenerative disc disease throughout the cervical spine.On 3/11/2019 an MRI of the lumbar spine was obtained and revealed left foraminal disc herniation/vertebral spurring at L4-L5 with left foraminal encroachment and impingement of the exiting left L4 nerve root, annular bulge/vertebral spurring at L5-S1 resulting in a thecal sac impingement and bilateral foraminal narrowing with bilateral L5 nerve root impingement, intraosseous discal herniations at T11-T12 and L1-L2, annular bulge at T11-T12 which flattens the ventral thecal sac, injury of the interspinous ligament at L5-S1. An MRI of the right shoulder obtained on 7/1/2019 revealed a linear partial-thickness interstitial delaminating tear, fraying/partial thickness tearing of the superior subscapularis tendon fibers, and mild  AC joint arthrosis. \par \par Since her last visit: She presents today with a rolling walker in July 2020 She was admitted to an OSH for 24 days.  She reports that she was having headaches and then suffered a seizure.  At the time of the admission in June 2020 she began to have difficulty walking and now has a right sided hemiparesis. A full neuroaxis work up was complete in [facility]. Today she reports the continued inability to walk and weakness. She reports neck and back pain. She also now reports pain in the left hand. Recently she had an evaluation with Dr Basil Velasquez. MRI scanning of the brain, cervical spine, thoracic spine and lumbar spine did not show any structural impingement of the brain or spinal cord. There is no significant disc herniation or stenosis. Recent flexion-extension x-rays show a solid arthrodesis at the area of her previous cervical spine surgery. Patient is currently under the care of Dr. Montgomery( neurology) and Dr. Chaudhry. \par \par Test or Referrals: Authorization for physical therapy for the right shoulder and for an EMG study of the right upper extremity.\par Work Status: The patient is currently not working, 100% disabled.\par Prognosis:The patients prognosis for recovery is guarded.

## 2021-01-12 NOTE — DISCUSSION/SUMMARY
[de-identified] : The underlying pathophysiology was reviewed in great detail with the patient as well as the various treatment options, including ice, analgesics, NSAIDs, Physical therapy, steroid injections, arthroscopic debridement.\par \par She is to continue with Physical Therapy, a prescription was provided. A request was sent to workers compensation. \par \par A referral was made for EMG.  A request was sent to workers compensation. \par \par FU 6 weeks.\par \par All questions were answered, all alternatives discussed and the patient is in complete agreement with that plan. Follow-up appointment as instructed. Any issues and the patient will call or come in sooner.

## 2021-02-04 ENCOUNTER — APPOINTMENT (OUTPATIENT)
Dept: FAMILY MEDICINE | Facility: CLINIC | Age: 51
End: 2021-02-04
Payer: MEDICARE

## 2021-02-04 VITALS
OXYGEN SATURATION: 98 % | DIASTOLIC BLOOD PRESSURE: 90 MMHG | WEIGHT: 173 LBS | HEART RATE: 57 BPM | BODY MASS INDEX: 30.65 KG/M2 | SYSTOLIC BLOOD PRESSURE: 120 MMHG | HEIGHT: 63 IN

## 2021-02-04 DIAGNOSIS — Z20.822 CONTACT WITH AND (SUSPECTED) EXPOSURE TO COVID-19: ICD-10-CM

## 2021-02-04 DIAGNOSIS — T14.8XXA OTHER INJURY OF UNSPECIFIED BODY REGION, INITIAL ENCOUNTER: ICD-10-CM

## 2021-02-04 PROCEDURE — 99072 ADDL SUPL MATRL&STAF TM PHE: CPT

## 2021-02-04 PROCEDURE — 99213 OFFICE O/P EST LOW 20 MIN: CPT

## 2021-02-04 NOTE — HISTORY OF PRESENT ILLNESS
[FreeTextEntry8] : c/o left chest pain starting from stomach region and radiating to left arm and back for over a mth, comes and goes, at times causing her weakness and dizziness\par hx of dvt, covid, high chol\par taking all her meds\par

## 2021-02-04 NOTE — PHYSICAL EXAM
[No Acute Distress] : no acute distress [Well Nourished] : well nourished [Normal Sclera/Conjunctiva] : normal sclera/conjunctiva [EOMI] : extraocular movements intact [Normal Outer Ear/Nose] : the outer ears and nose were normal in appearance [Normal] : normal rate, regular rhythm, normal S1 and S2 and no murmur heard [No Varicosities] : no varicosities [No Edema] : there was no peripheral edema [No Extremity Clubbing/Cyanosis] : no extremity clubbing/cyanosis [Normal Appearance] : normal in appearance [No Masses] : no palpable masses [No Nipple Discharge] : no nipple discharge [No Axillary Lymphadenopathy] : no axillary lymphadenopathy [Soft] : abdomen soft [Non Tender] : non-tender [Non-distended] : non-distended [No HSM] : no HSM [Normal Bowel Sounds] : normal bowel sounds [Coordination Grossly Intact] : coordination grossly intact [Normal Affect] : the affect was normal [Alert and Oriented x3] : oriented to person, place, and time [Normal Insight/Judgement] : insight and judgment were intact [de-identified] : tender to 3 o'clock of left breast [de-identified] : ambulates w/ walker [de-identified] : mildly anxious

## 2021-02-05 LAB
ALBUMIN SERPL ELPH-MCNC: 4.6 G/DL
ALP BLD-CCNC: 97 U/L
ALT SERPL-CCNC: 16 U/L
ANION GAP SERPL CALC-SCNC: 14 MMOL/L
AST SERPL-CCNC: 17 U/L
BASOPHILS # BLD AUTO: 0.03 K/UL
BASOPHILS NFR BLD AUTO: 0.5 %
BILIRUB SERPL-MCNC: 0.7 MG/DL
BUN SERPL-MCNC: 14 MG/DL
CALCIUM SERPL-MCNC: 10 MG/DL
CHLORIDE SERPL-SCNC: 105 MMOL/L
CHOLEST SERPL-MCNC: 256 MG/DL
CK SERPL-CCNC: 48 U/L
CO2 SERPL-SCNC: 22 MMOL/L
CREAT SERPL-MCNC: 0.98 MG/DL
CRP SERPL-MCNC: 0.16 MG/DL
DEPRECATED D DIMER PPP IA-ACNC: <150 NG/ML DDU
EOSINOPHIL # BLD AUTO: 0.08 K/UL
EOSINOPHIL NFR BLD AUTO: 1.5 %
ERYTHROCYTE [SEDIMENTATION RATE] IN BLOOD BY WESTERGREN METHOD: 5 MM/HR
GLUCOSE SERPL-MCNC: 76 MG/DL
HCT VFR BLD CALC: 43.5 %
HDLC SERPL-MCNC: 74 MG/DL
HGB BLD-MCNC: 14.1 G/DL
IMM GRANULOCYTES NFR BLD AUTO: 0.2 %
LDLC SERPL CALC-MCNC: 168 MG/DL
LDLC SERPL DIRECT ASSAY-MCNC: 168 MG/DL
LYMPHOCYTES # BLD AUTO: 2.08 K/UL
LYMPHOCYTES NFR BLD AUTO: 38.1 %
MAN DIFF?: NORMAL
MCHC RBC-ENTMCNC: 29.8 PG
MCHC RBC-ENTMCNC: 32.4 GM/DL
MCV RBC AUTO: 92 FL
MONOCYTES # BLD AUTO: 0.31 K/UL
MONOCYTES NFR BLD AUTO: 5.7 %
NEUTROPHILS # BLD AUTO: 2.95 K/UL
NEUTROPHILS NFR BLD AUTO: 54 %
NONHDLC SERPL-MCNC: 182 MG/DL
PLATELET # BLD AUTO: 187 K/UL
POTASSIUM SERPL-SCNC: 4.4 MMOL/L
PROT SERPL-MCNC: 6.8 G/DL
RBC # BLD: 4.73 M/UL
RBC # FLD: 13.6 %
SODIUM SERPL-SCNC: 141 MMOL/L
TRIGL SERPL-MCNC: 73 MG/DL
WBC # FLD AUTO: 5.46 K/UL

## 2021-02-23 ENCOUNTER — APPOINTMENT (OUTPATIENT)
Dept: ORTHOPEDIC SURGERY | Facility: CLINIC | Age: 51
End: 2021-02-23

## 2021-02-26 ENCOUNTER — APPOINTMENT (OUTPATIENT)
Dept: FAMILY MEDICINE | Facility: CLINIC | Age: 51
End: 2021-02-26
Payer: MEDICARE

## 2021-02-26 VITALS
BODY MASS INDEX: 30.65 KG/M2 | SYSTOLIC BLOOD PRESSURE: 100 MMHG | DIASTOLIC BLOOD PRESSURE: 64 MMHG | RESPIRATION RATE: 18 BRPM | OXYGEN SATURATION: 98 % | HEART RATE: 63 BPM | WEIGHT: 173 LBS | HEIGHT: 63 IN | TEMPERATURE: 97.3 F

## 2021-02-26 DIAGNOSIS — R06.83 SNORING: ICD-10-CM

## 2021-02-26 DIAGNOSIS — I82.492 ACUTE EMBOLISM AND THROMBOSIS OF OTHER SPECIFIED DEEP VEIN OF LEFT LOWER EXTREMITY: ICD-10-CM

## 2021-02-26 DIAGNOSIS — R31.21 ASYMPTOMATIC MICROSCOPIC HEMATURIA: ICD-10-CM

## 2021-02-26 DIAGNOSIS — Z78.0 ASYMPTOMATIC MENOPAUSAL STATE: ICD-10-CM

## 2021-02-26 PROCEDURE — 99072 ADDL SUPL MATRL&STAF TM PHE: CPT

## 2021-02-26 PROCEDURE — 99212 OFFICE O/P EST SF 10 MIN: CPT

## 2021-02-26 NOTE — HISTORY OF PRESENT ILLNESS
[Spouse] : spouse [FreeTextEntry1] : c/o bruising on and off\par  states she snores and stops breathing at times, recent wgt gain, persistent headaches

## 2022-05-06 ENCOUNTER — APPOINTMENT (OUTPATIENT)
Dept: ORTHOPEDIC SURGERY | Facility: CLINIC | Age: 52
End: 2022-05-06
Payer: OTHER MISCELLANEOUS

## 2022-05-06 VITALS — WEIGHT: 180 LBS | BODY MASS INDEX: 31.89 KG/M2 | HEIGHT: 63 IN

## 2022-05-06 PROCEDURE — 99214 OFFICE O/P EST MOD 30 MIN: CPT

## 2022-05-06 PROCEDURE — 99072 ADDL SUPL MATRL&STAF TM PHE: CPT

## 2022-05-06 RX ORDER — DICLOFENAC SODIUM 75 MG/1
75 TABLET, DELAYED RELEASE ORAL
Qty: 60 | Refills: 1 | Status: ACTIVE | COMMUNITY
Start: 2022-05-06 | End: 1900-01-01

## 2022-05-06 NOTE — PHYSICAL EXAM
[Normal RUE] : Right Upper Extremity: No scars, rashes, lesions, ulcers, skin intact [Normal LUE] : Left Upper Extremity: No scars, rashes, lesions, ulcers, skin intact [Normal Touch] : sensation intact for touch [Normal] : No swelling, no edema, normal pedal pulses and normal temperature [Poor Appearance] : well-appearing [Acute Distress] : not in acute distress [Obese] : not obese [de-identified] : Cervical Spine/Neck\par Inspection/Palpation :\par ¦ Inspection : alignment midline, decreased degree of lordosis present\par ¦ Skin : normal appearance, no masses, no tenderness, trachea midline\par ¦ Palpation : + right trapezius and paraspinal tenderness\par ¦ Tests and Signs : Spurling’s (+) to the right, Lhermitte’s (-)\par ¦ Range of Motion : limited ROM in all directions with marked right sided neck pain with left rotation, no crepitus with ROM\par ¦ Stability : no subluxations or other evidence of instability demonstrated during range of motion testing\par o Muscle Strength : paraspinal muscle strength within normal limits\par o Muscle Tone : paraspinal muscle tone within normal limits\par o Muscle Bulk : normal, no atrophy\par o Cervical Lymph Nodes : no lymphadenopathy present \par \par Right Upper Extremity\par o Shoulder :\par ¦ Inspection/Palpation : tenderness over the greater tuberosity, no swelling, no deformities\par ¦ Range of Motion : ACTIVE FORWARD ELEVATION: Measured at 50 degrees, ACTIVE EXTERNAL ROTATION: Measured at 30 degrees, ACTIVE INTERNAL ROTATION: Measured at GT ACTIVE ABDUCTION: Measured at 50 degrees\par ¦ Strength : external rotation 3/5, internal rotation 3/5, supraspinatus 3/5 with pain\par ¦ Stability : no joint instability on provocative testing\par ¦ Tests and Signs: Bridges (+), Neer (+)\par o Upper Arm : + diffuse tenderness, no swelling, no deformities\par o Muscle Bulk : no atrophy \par o Sensation : sensation intact to light touch \par o Skin : no skin rash or discoloration \par o Vascular Exam : no edema, no cyanosis, radial and ulnar pulses normal , hypervascularity of right hand. \par ¦ Upper Extremity Strength : elbow flexion 3/5 and extension 3/5, wrist extension 3/5, flexion 3/5,  all intrinsic and extrinsic hand muscles 3/5,  strength 2/5 Ankit’s Reflex (-), pain radiating to the fingers\par

## 2022-05-06 NOTE — DISCUSSION/SUMMARY
[de-identified] : The underlying pathophysiology was reviewed in great detail with the patient as well as the various treatment options, including ice, analgesics, NSAIDs, Physical therapy, steroid injections, arthroscopic debridement.\par \par She is to continue with Physical Therapy, a prescription was provided. A request was sent to workers compensation. \par \par A referral was made for EMG.  A request was sent to workers compensation. \par \par FU 6 weeks.\par \par All questions were answered, all alternatives discussed and the patient is in complete agreement with that plan. Follow-up appointment as instructed. Any issues and the patient will call or come in sooner.

## 2022-05-06 NOTE — HISTORY OF PRESENT ILLNESS
[de-identified] : Worker’s Compensation Visit:\par 50 year old female presents for an follow up of right shoulder pain and neck pain. The patient presents with an injury to the right shoulder, neck, and right hand that occurred on 5/25/2018.The patient works as a  at a school and was injured at work while cleaning the toilet when it exploded when she flushed it. After the injury she was taken to the Hospital and had XRays taken in the ED. On 6/2/2018 an MRI of the cervical spine was obtained and revealed progressive multilevel degenerative spondylitic change with asymmetries and cord compression. On 9/7/2018 she underwent a discectomy and discoplasty of C4-5 and C5-6. On 3/11/2019 XRays of the right shoulder were obtained an revealed unremarkable findings, no acute fractures. On 3/11/2019 XRays of the cervical spine were obtained and revealed s/p discectomy and discoplasty of C4-5 and C5-6, no acute fractures, degenerative disc disease throughout the cervical spine.On 3/11/2019 an MRI of the lumbar spine was obtained and revealed left foraminal disc herniation/vertebral spurring at L4-L5 with left foraminal encroachment and impingement of the exiting left L4 nerve root, annular bulge/vertebral spurring at L5-S1 resulting in a thecal sac impingement and bilateral foraminal narrowing with bilateral L5 nerve root impingement, intraosseous discal herniations at T11-T12 and L1-L2, annular bulge at T11-T12 which flattens the ventral thecal sac, injury of the interspinous ligament at L5-S1. An MRI of the right shoulder obtained on 7/1/2019 revealed a linear partial-thickness interstitial delaminating tear, fraying/partial thickness tearing of the superior subscapularis tendon fibers, and mild AC joint arthrosis. \par An EMG had been ordered at her last visit but it was never approved.\par \par She presents today with a 4 point cane \par \par Test or Referrals: Authorization for physical therapy for the right shoulder and for an EMG study of the right upper extremity.\par Work Status: The patient is currently not working, 100% disabled.\par Prognosis:The patients prognosis for recovery is guarded.

## 2022-06-09 NOTE — OCCUPATIONAL THERAPY INITIAL EVALUATION ADULT - FINE MOTOR COORDINATION, RIGHT HAND THUMB/FINGER OPPOSITION SKILLS, OT EVAL
Health Maintenance Due   Topic Date Due   • Meningococcal Vaccine (2 - Risk 2-dose series) 12/21/2020   • Osteoporosis Screening  Never done   • Meningococcal Serogroup B Vaccine (3 of 4 - Increased Risk Bexsero 2-dose series) 09/28/2021   • DM/CKD Microalbumin  04/09/2022       Patient is due for topics as listed above but is not proceeding with Immunization(s) Meningococcal and Meningococcal Serogroup B and Osteoporosis screening at this time. Education provided for Immunization(s) Meningococcal and Meningococcal Serogroup B and Osteoporosis screening.  
moderate impairment

## 2022-06-23 ENCOUNTER — APPOINTMENT (OUTPATIENT)
Dept: FAMILY MEDICINE | Facility: CLINIC | Age: 52
End: 2022-06-23
Payer: MEDICARE

## 2022-06-23 ENCOUNTER — LABORATORY RESULT (OUTPATIENT)
Age: 52
End: 2022-06-23

## 2022-06-23 VITALS
WEIGHT: 176 LBS | DIASTOLIC BLOOD PRESSURE: 60 MMHG | OXYGEN SATURATION: 98 % | SYSTOLIC BLOOD PRESSURE: 110 MMHG | TEMPERATURE: 98.6 F | HEART RATE: 66 BPM | BODY MASS INDEX: 31.18 KG/M2 | HEIGHT: 63 IN

## 2022-06-23 DIAGNOSIS — R53.83 OTHER FATIGUE: ICD-10-CM

## 2022-06-23 DIAGNOSIS — N64.4 MASTODYNIA: ICD-10-CM

## 2022-06-23 DIAGNOSIS — R07.89 OTHER CHEST PAIN: ICD-10-CM

## 2022-06-23 DIAGNOSIS — U07.1 COVID-19: ICD-10-CM

## 2022-06-23 DIAGNOSIS — Z86.2 PERSONAL HISTORY OF DISEASES OF THE BLOOD AND BLOOD-FORMING ORGANS AND CERTAIN DISORDERS INVOLVING THE IMMUNE MECHANISM: ICD-10-CM

## 2022-06-23 DIAGNOSIS — R14.3 FLATULENCE: ICD-10-CM

## 2022-06-23 DIAGNOSIS — R14.1 FLATULENCE: ICD-10-CM

## 2022-06-23 DIAGNOSIS — R14.2 FLATULENCE: ICD-10-CM

## 2022-06-23 DIAGNOSIS — Z11.59 ENCOUNTER FOR SCREENING FOR OTHER VIRAL DISEASES: ICD-10-CM

## 2022-06-23 DIAGNOSIS — Z86.718 PERSONAL HISTORY OF OTHER VENOUS THROMBOSIS AND EMBOLISM: ICD-10-CM

## 2022-06-23 DIAGNOSIS — R42 DIZZINESS AND GIDDINESS: ICD-10-CM

## 2022-06-23 DIAGNOSIS — Z98.890 OTHER SPECIFIED POSTPROCEDURAL STATES: ICD-10-CM

## 2022-06-23 DIAGNOSIS — Z92.29 PERSONAL HISTORY OF OTHER DRUG THERAPY: ICD-10-CM

## 2022-06-23 DIAGNOSIS — R51.9 HEADACHE, UNSPECIFIED: ICD-10-CM

## 2022-06-23 PROCEDURE — 99215 OFFICE O/P EST HI 40 MIN: CPT | Mod: 25

## 2022-06-23 NOTE — PHYSICAL EXAM
[No Acute Distress] : no acute distress [Well Nourished] : well nourished [Normal Sclera/Conjunctiva] : normal sclera/conjunctiva [EOMI] : extraocular movements intact [Normal Outer Ear/Nose] : the outer ears and nose were normal in appearance [No JVD] : no jugular venous distention [Normal] : normal rate, regular rhythm, normal S1 and S2 and no murmur heard [Normal Affect] : the affect was normal [Alert and Oriented x3] : oriented to person, place, and time [Normal Insight/Judgement] : insight and judgment were intact [Coordination Grossly Intact] : coordination grossly intact [No Focal Deficits] : no focal deficits [No Edema] : there was no peripheral edema [No Extremity Clubbing/Cyanosis] : no extremity clubbing/cyanosis [de-identified] : rt anterior leg w/ roundish small tender subcutaneous mass vs varicosity [de-identified] : ambulates w/ cane

## 2022-06-23 NOTE — PLAN
[FreeTextEntry1] : jaelk bw\par refilled rx\par restart meds\par allowed to vent, emotional support provided\par rtc for cpe

## 2022-06-23 NOTE — HISTORY OF PRESENT ILLNESS
[Family Member] : family member [FreeTextEntry1] : 51 yo female comes in w/ multiple c/o\par here for follow up; c/o dizziness and pain throughout the body including neck; hx of cervical spine surgery\par brings in NCV/S results performed by podiatrist, dx'd w/ neuropathy;\par also c/o small lump to rt lower leg, states podiatrist stuck needle in it approx 1 mth ago, and it is still painful; hx of DVTs\par c/o depression, stress, forgetfulness, headache, fatigue, stopped topiramate\par ran out of statin\par  [de-identified] : following w/ vascular, dopplers done last yr, hx of DVT

## 2022-06-23 NOTE — REVIEW OF SYSTEMS
[Dizziness] : dizziness [Unsteady Walking] : ataxia [Depression] : depression [Negative] : Respiratory [Muscle Weakness] : muscle weakness [Muscle Pain] : muscle pain [Back Pain] : back pain [Suicidal] : not suicidal [de-identified] : forgetfulness

## 2022-06-24 LAB
25(OH)D3 SERPL-MCNC: 23.1 NG/ML
ALBUMIN SERPL ELPH-MCNC: 4.6 G/DL
ALP BLD-CCNC: 86 U/L
ALT SERPL-CCNC: 22 U/L
ANION GAP SERPL CALC-SCNC: 13 MMOL/L
AST SERPL-CCNC: 20 U/L
BILIRUB SERPL-MCNC: 0.4 MG/DL
BUN SERPL-MCNC: 13 MG/DL
CALCIUM SERPL-MCNC: 9.3 MG/DL
CHLORIDE SERPL-SCNC: 105 MMOL/L
CHOLEST SERPL-MCNC: 264 MG/DL
CO2 SERPL-SCNC: 26 MMOL/L
CREAT SERPL-MCNC: 0.79 MG/DL
EGFR: 91 ML/MIN/1.73M2
GLUCOSE SERPL-MCNC: 90 MG/DL
HDLC SERPL-MCNC: 70 MG/DL
LDLC SERPL CALC-MCNC: 180 MG/DL
NONHDLC SERPL-MCNC: 195 MG/DL
POTASSIUM SERPL-SCNC: 4.1 MMOL/L
PROT SERPL-MCNC: 6.7 G/DL
SODIUM SERPL-SCNC: 144 MMOL/L
TRIGL SERPL-MCNC: 74 MG/DL
TSH SERPL-ACNC: 1.73 UIU/ML

## 2022-06-27 ENCOUNTER — APPOINTMENT (OUTPATIENT)
Dept: FAMILY MEDICINE | Facility: CLINIC | Age: 52
End: 2022-06-27
Payer: MEDICARE

## 2022-06-27 DIAGNOSIS — M54.6 PAIN IN THORACIC SPINE: ICD-10-CM

## 2022-06-27 DIAGNOSIS — R21 RASH AND OTHER NONSPECIFIC SKIN ERUPTION: ICD-10-CM

## 2022-06-27 DIAGNOSIS — I74.3 EMBOLISM AND THROMBOSIS OF ARTERIES OF THE LOWER EXTREMITIES: ICD-10-CM

## 2022-06-27 DIAGNOSIS — M79.604 PAIN IN RIGHT LEG: ICD-10-CM

## 2022-06-27 DIAGNOSIS — R29.898 OTHER SYMPTOMS AND SIGNS INVOLVING THE MUSCULOSKELETAL SYSTEM: ICD-10-CM

## 2022-06-27 DIAGNOSIS — M79.605 PAIN IN RIGHT LEG: ICD-10-CM

## 2022-06-27 LAB
BASOPHILS # BLD AUTO: 0.02 K/UL
BASOPHILS NFR BLD AUTO: 0.4 %
EOSINOPHIL # BLD AUTO: 0.1 K/UL
EOSINOPHIL NFR BLD AUTO: 2 %
HCT VFR BLD CALC: 44.2 %
HGB BLD-MCNC: 12.7 G/DL
IMM GRANULOCYTES NFR BLD AUTO: 0.6 %
LYMPHOCYTES # BLD AUTO: 1.81 K/UL
LYMPHOCYTES NFR BLD AUTO: 35.8 %
MAN DIFF?: NORMAL
MCHC RBC-ENTMCNC: 28.7 GM/DL
MCHC RBC-ENTMCNC: 30.2 PG
MCV RBC AUTO: 105 FL
MONOCYTES # BLD AUTO: 0.2 K/UL
MONOCYTES NFR BLD AUTO: 4 %
NEUTROPHILS # BLD AUTO: 2.89 K/UL
NEUTROPHILS NFR BLD AUTO: 57.2 %
PLATELET # BLD AUTO: 171 K/UL
RBC # BLD: 4.21 M/UL
RBC # FLD: 15.1 %
WBC # FLD AUTO: 5.05 K/UL

## 2022-06-27 PROCEDURE — 99443: CPT | Mod: 95

## 2022-06-27 NOTE — HISTORY OF PRESENT ILLNESS
[Other Location: e.g. School (Enter Location, City,State)___] : at [unfilled], at the time of the visit. [Medical Office: ([facility])___] : at the medical office located in  [Verbal consent obtained from patient] : the patient, [unfilled] [FreeTextEntry1] : to review leg doppler; c/o headache, chest pain and trouble breathing yesterday, denies today

## 2022-06-27 NOTE — PHYSICAL EXAM
[No Acute Distress] : no acute distress [EOMI] : extraocular movements intact [No Respiratory Distress] : no respiratory distress  [Normal Affect] : the affect was normal [Alert and Oriented x3] : oriented to person, place, and time [Normal Insight/Judgement] : insight and judgment were intact

## 2022-06-27 NOTE — PLAN
[FreeTextEntry1] : Advised ER if persistent cp and sob, pt deferring at this time\par restart eliquis\par f/u w/ vascular\par pt will rtc for fasting bw in 2 wks and cpe

## 2022-07-11 ENCOUNTER — APPOINTMENT (OUTPATIENT)
Dept: FAMILY MEDICINE | Facility: CLINIC | Age: 52
End: 2022-07-11

## 2022-08-09 ENCOUNTER — APPOINTMENT (OUTPATIENT)
Dept: FAMILY MEDICINE | Facility: CLINIC | Age: 52
End: 2022-08-09

## 2022-08-09 VITALS
HEIGHT: 63 IN | BODY MASS INDEX: 32.25 KG/M2 | SYSTOLIC BLOOD PRESSURE: 110 MMHG | WEIGHT: 182 LBS | TEMPERATURE: 98.1 F | DIASTOLIC BLOOD PRESSURE: 68 MMHG | HEART RATE: 89 BPM | OXYGEN SATURATION: 98 %

## 2022-08-09 PROCEDURE — 99496 TRANSJ CARE MGMT HIGH F2F 7D: CPT

## 2022-08-09 NOTE — PHYSICAL EXAM
[No Acute Distress] : no acute distress [Well Nourished] : well nourished [Normal Sclera/Conjunctiva] : normal sclera/conjunctiva [EOMI] : extraocular movements intact [Normal Outer Ear/Nose] : the outer ears and nose were normal in appearance [No JVD] : no jugular venous distention [Normal] : normal rate, regular rhythm, normal S1 and S2 and no murmur heard [No Edema] : there was no peripheral edema [Normal Affect] : the affect was normal [Alert and Oriented x3] : oriented to person, place, and time [Normal Insight/Judgement] : insight and judgment were intact [69660 - High Complexity requires an extensive number of possible diagnoses and/or the management options, extensive complexity of the medical data (tests, etc.) to be reviewed, and a high risk of significant complications, morbidity, and/or mortality as w] : High Complexity  [de-identified] : MOHIT
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
## 2022-09-07 ENCOUNTER — APPOINTMENT (OUTPATIENT)
Dept: FAMILY MEDICINE | Facility: CLINIC | Age: 52
End: 2022-09-07

## 2022-09-22 NOTE — OCCUPATIONAL THERAPY INITIAL EVALUATION ADULT - PERTINENT HX OF CURRENT PROBLEM, REHAB EVAL
----- Message from Aleks Weaver MD sent at 9/21/2022  9:22 PM CDT -----  Please notify the patient of normal results.  Follow-up as planned.   49 yo [place] speaking F from [place], reports an injury while at work on 5/23/18 while cleaning a toilet a piece of ceramic broke and a piece hit her on the head knocking her backward into the wall, ever since the accident she's had dizziness and B arm weakness and tingling. CT scan revealed cervical cord stenosis and cord impingement C4-5 and C5-6. Pt. presents to PST today for scheduled C4-6 Anterior Cervical Discectomy and Fusion on 9/7/18.  See below

## 2022-09-23 ENCOUNTER — APPOINTMENT (OUTPATIENT)
Dept: VASCULAR SURGERY | Facility: CLINIC | Age: 52
End: 2022-09-23

## 2022-10-26 ENCOUNTER — LABORATORY RESULT (OUTPATIENT)
Age: 52
End: 2022-10-26

## 2022-10-26 ENCOUNTER — APPOINTMENT (OUTPATIENT)
Dept: SURGERY | Facility: CLINIC | Age: 52
End: 2022-10-26

## 2022-10-26 ENCOUNTER — APPOINTMENT (OUTPATIENT)
Dept: FAMILY MEDICINE | Facility: CLINIC | Age: 52
End: 2022-10-26

## 2022-10-26 VITALS
WEIGHT: 182 LBS | DIASTOLIC BLOOD PRESSURE: 70 MMHG | OXYGEN SATURATION: 98 % | BODY MASS INDEX: 32.25 KG/M2 | TEMPERATURE: 97.89 F | HEIGHT: 63 IN | HEART RATE: 70 BPM | SYSTOLIC BLOOD PRESSURE: 119 MMHG

## 2022-10-26 VITALS
BODY MASS INDEX: 32.25 KG/M2 | OXYGEN SATURATION: 98 % | HEART RATE: 70 BPM | SYSTOLIC BLOOD PRESSURE: 111 MMHG | TEMPERATURE: 98.2 F | HEIGHT: 63 IN | DIASTOLIC BLOOD PRESSURE: 75 MMHG | WEIGHT: 182 LBS

## 2022-10-26 DIAGNOSIS — I82.409 ACUTE EMBOLISM AND THROMBOSIS OF UNSPECIFIED DEEP VEINS OF UNSPECIFIED LOWER EXTREMITY: ICD-10-CM

## 2022-10-26 DIAGNOSIS — R53.1 WEAKNESS: ICD-10-CM

## 2022-10-26 DIAGNOSIS — L98.9 DISORDER OF THE SKIN AND SUBCUTANEOUS TISSUE, UNSPECIFIED: ICD-10-CM

## 2022-10-26 DIAGNOSIS — I63.9 CEREBRAL INFARCTION, UNSPECIFIED: ICD-10-CM

## 2022-10-26 DIAGNOSIS — R29.898 OTHER SYMPTOMS AND SIGNS INVOLVING THE MUSCULOSKELETAL SYSTEM: ICD-10-CM

## 2022-10-26 DIAGNOSIS — M62.9 DISORDER OF MUSCLE, UNSPECIFIED: ICD-10-CM

## 2022-10-26 LAB
BASOPHILS # BLD AUTO: 0.03 K/UL
BASOPHILS NFR BLD AUTO: 0.6 %
EOSINOPHIL # BLD AUTO: 0.09 K/UL
EOSINOPHIL NFR BLD AUTO: 1.9 %
HCT VFR BLD CALC: 41.2 %
HGB BLD-MCNC: 13.5 G/DL
IMM GRANULOCYTES NFR BLD AUTO: 0.4 %
LYMPHOCYTES # BLD AUTO: 1.86 K/UL
LYMPHOCYTES NFR BLD AUTO: 39.1 %
MAN DIFF?: NORMAL
MCHC RBC-ENTMCNC: 30.6 PG
MCHC RBC-ENTMCNC: 32.8 GM/DL
MCV RBC AUTO: 93.4 FL
MONOCYTES # BLD AUTO: 0.33 K/UL
MONOCYTES NFR BLD AUTO: 6.9 %
NEUTROPHILS # BLD AUTO: 2.43 K/UL
NEUTROPHILS NFR BLD AUTO: 51.1 %
PLATELET # BLD AUTO: 185 K/UL
RBC # BLD: 4.41 M/UL
RBC # FLD: 12.9 %
WBC # FLD AUTO: 4.76 K/UL

## 2022-10-26 PROCEDURE — 36415 COLL VENOUS BLD VENIPUNCTURE: CPT

## 2022-10-26 PROCEDURE — G0438: CPT

## 2022-10-26 PROCEDURE — 99202 OFFICE O/P NEW SF 15 MIN: CPT

## 2022-10-26 RX ORDER — GABAPENTIN 400 MG/1
400 CAPSULE ORAL 3 TIMES DAILY
Qty: 270 | Refills: 1 | Status: ACTIVE | COMMUNITY
Start: 2019-05-23 | End: 1900-01-01

## 2022-10-26 RX ORDER — CEPHALEXIN 500 MG/1
500 CAPSULE ORAL
Qty: 15 | Refills: 0 | Status: COMPLETED | COMMUNITY
Start: 2022-10-12

## 2022-10-26 NOTE — PHYSICAL EXAM
[No Acute Distress] : no acute distress [Well Nourished] : well nourished [Well Developed] : well developed [Well-Appearing] : well-appearing [Normal Sclera/Conjunctiva] : normal sclera/conjunctiva [PERRL] : pupils equal round and reactive to light [EOMI] : extraocular movements intact [Normal Outer Ear/Nose] : the outer ears and nose were normal in appearance [Normal Oropharynx] : the oropharynx was normal [Normal TMs] : both tympanic membranes were normal [No JVD] : no jugular venous distention [No Lymphadenopathy] : no lymphadenopathy [Supple] : supple [Thyroid Normal, No Nodules] : the thyroid was normal and there were no nodules present [No Respiratory Distress] : no respiratory distress  [No Accessory Muscle Use] : no accessory muscle use [Clear to Auscultation] : lungs were clear to auscultation bilaterally [Normal Rate] : normal rate  [Regular Rhythm] : with a regular rhythm [Normal S1, S2] : normal S1 and S2 [No Murmur] : no murmur heard [No Abdominal Bruit] : a ~M bruit was not heard ~T in the abdomen [No Varicosities] : no varicosities [No Edema] : there was no peripheral edema [No Palpable Aorta] : no palpable aorta [No Extremity Clubbing/Cyanosis] : no extremity clubbing/cyanosis [Soft] : abdomen soft [Non Tender] : non-tender [Non-distended] : non-distended [No Masses] : no abdominal mass palpated [No HSM] : no HSM [Normal Bowel Sounds] : normal bowel sounds [Normal Supraclavicular Nodes] : no supraclavicular lymphadenopathy [Normal Posterior Cervical Nodes] : no posterior cervical lymphadenopathy [Normal Anterior Cervical Nodes] : no anterior cervical lymphadenopathy [No CVA Tenderness] : no CVA  tenderness [No Spinal Tenderness] : no spinal tenderness [No Joint Swelling] : no joint swelling [Grossly Normal Strength/Tone] : grossly normal strength/tone [No Rash] : no rash [Coordination Grossly Intact] : coordination grossly intact [Normal Affect] : the affect was normal [Alert and Oriented x3] : oriented to person, place, and time [Normal Insight/Judgement] : insight and judgment were intact [de-identified] : tense trapezius firm [de-identified] : tender subcutaneous firm small round mass to RLL; tender hyperpigmented large birthmark to rt upper back [de-identified] : MOHIT, uses w/c

## 2022-10-26 NOTE — PHYSICAL EXAM
[Respiratory Effort] : normal respiratory effort [Calm] : calm [de-identified] : JORY RODAS NAD [de-identified] : EOMI [de-identified] : + superficial tender mobile nodule of right lower extremity.

## 2022-10-26 NOTE — HISTORY OF PRESENT ILLNESS
[Family Member] : family member [FreeTextEntry1] : 52 year old female who presents today for a complete physical exam.\par doing PT 2x/wk helping w/ her mobility, asking for walker script\par c/o painful mass to rt lower leg\par c/o rt knee collapsing when attempting to walk, states PT recommended knee device\par c/o chronic headaches, tried new med which worked, but not covered by insurance?\par c/o painful birthmark to upper back\par c/o difficulty sleeping due to painful lower back and rt hip, unable to turn on her side\par c/o inability to lift rt arm; hx of rsw\par c/o depressed mood, not taking duloxetine\par accompanied by son

## 2022-10-26 NOTE — HISTORY OF PRESENT ILLNESS
[de-identified] : 53 yo female with chronic back pain and knee issues was sent over from the internist office down stairs for evaluation of a painful right lower extremity mass. patient states the mass is tender and burns. Pain sometimes radiates to foot. Patient is on Eliquis for chronic DVT.

## 2022-10-26 NOTE — PLAN
[FreeTextEntry1] : Needs Two button folding walker w/ 5" wheels\par f/u w/ specialists\par restart meds\par chk bw\par allowed to vent, emotional support provided\par obtain neuro and cardio consult notes\par obtain name of knee device recommended

## 2022-10-26 NOTE — HEALTH RISK ASSESSMENT
[Never] : Never [No] : In the past 12 months have you used drugs other than those required for medical reasons? No [Fair] :  ~his/her~ mood as fair [de-identified] : Dr. Aguirre, neuro, PT, Dr. Myke Shultz [de-identified] : doing PT [de-identified] : more or less

## 2022-10-26 NOTE — REASON FOR VISIT
[Consultation] : a consultation visit [Spouse] : spouse [FreeTextEntry1] : painful right lower extremity mass

## 2022-10-27 LAB
25(OH)D3 SERPL-MCNC: 28.7 NG/ML
ALBUMIN SERPL ELPH-MCNC: 4.8 G/DL
ALP BLD-CCNC: 106 U/L
ALT SERPL-CCNC: 20 U/L
ANION GAP SERPL CALC-SCNC: 12 MMOL/L
APPEARANCE: CLEAR
AST SERPL-CCNC: 20 U/L
BILIRUB SERPL-MCNC: 0.6 MG/DL
BILIRUBIN URINE: NEGATIVE
BLOOD URINE: NEGATIVE
BUN SERPL-MCNC: 12 MG/DL
CALCIUM SERPL-MCNC: 9.7 MG/DL
CHLORIDE SERPL-SCNC: 106 MMOL/L
CHOLEST SERPL-MCNC: 270 MG/DL
CO2 SERPL-SCNC: 26 MMOL/L
COLOR: YELLOW
CREAT SERPL-MCNC: 0.79 MG/DL
EGFR: 91 ML/MIN/1.73M2
GLUCOSE QUALITATIVE U: NEGATIVE
GLUCOSE SERPL-MCNC: 94 MG/DL
HDLC SERPL-MCNC: 79 MG/DL
KETONES URINE: NEGATIVE
LDLC SERPL CALC-MCNC: 171 MG/DL
LEUKOCYTE ESTERASE URINE: ABNORMAL
NITRITE URINE: NEGATIVE
NONHDLC SERPL-MCNC: 191 MG/DL
PH URINE: 6
POTASSIUM SERPL-SCNC: 4.1 MMOL/L
PROT SERPL-MCNC: 7 G/DL
PROTEIN URINE: NORMAL
SODIUM SERPL-SCNC: 144 MMOL/L
SPECIFIC GRAVITY URINE: 1.03
TRIGL SERPL-MCNC: 99 MG/DL
TSH SERPL-ACNC: 2.77 UIU/ML
UROBILINOGEN URINE: NORMAL

## 2022-11-02 ENCOUNTER — APPOINTMENT (OUTPATIENT)
Dept: SURGERY | Facility: CLINIC | Age: 52
End: 2022-11-02

## 2022-11-02 VITALS
SYSTOLIC BLOOD PRESSURE: 108 MMHG | TEMPERATURE: 98.2 F | WEIGHT: 182 LBS | DIASTOLIC BLOOD PRESSURE: 73 MMHG | OXYGEN SATURATION: 97 % | HEIGHT: 63 IN | HEART RATE: 81 BPM | BODY MASS INDEX: 32.25 KG/M2

## 2022-11-02 DIAGNOSIS — R22.41 LOCALIZED SWELLING, MASS AND LUMP, RIGHT LOWER LIMB: ICD-10-CM

## 2022-11-02 PROCEDURE — 99211 OFF/OP EST MAY X REQ PHY/QHP: CPT | Mod: 25

## 2022-11-02 PROCEDURE — 27618 EXC LEG/ANKLE TUM < 3 CM: CPT

## 2022-11-02 NOTE — ASSESSMENT
[FreeTextEntry1] : patient was prepped and draped in the usual fashion.\par 2cc 1% lidocaine was infiltrated over the skin and surrounding tissue.\par a small incision was made over the mass and it was dissected free from within the subcutaneous tissues. \par A small <1 cm solid mass was removed in its entirety and sent for pathologic evaluation.\par \par hemostasis was achieved  and 4-0 Monocryl suture was used for closure. steri strips and compression dressing applied.\par Patient tolerated the procedure well.\par \par f/u 1 week

## 2022-11-07 ENCOUNTER — APPOINTMENT (OUTPATIENT)
Dept: SURGERY | Facility: CLINIC | Age: 52
End: 2022-11-07

## 2022-11-09 ENCOUNTER — APPOINTMENT (OUTPATIENT)
Dept: SURGERY | Facility: CLINIC | Age: 52
End: 2022-11-09

## 2022-11-09 VITALS
WEIGHT: 182 LBS | TEMPERATURE: 97.2 F | OXYGEN SATURATION: 95 % | SYSTOLIC BLOOD PRESSURE: 99 MMHG | HEIGHT: 63 IN | BODY MASS INDEX: 32.25 KG/M2 | DIASTOLIC BLOOD PRESSURE: 60 MMHG | HEART RATE: 76 BPM

## 2022-11-09 PROCEDURE — 99024 POSTOP FOLLOW-UP VISIT: CPT

## 2022-12-26 NOTE — OCCUPATIONAL THERAPY INITIAL EVALUATION ADULT - LIGHT TOUCH SENSATION, RUE, REHAB EVAL
A/P: Patient is a 20 year old  at 39w by 1st tri US who presents to L&D for rCS  -Admit to L&D  -Consent  -Admission labs  -NPO, except ice chips   -IV fluids  -Fetus: Cat I tracing.   -GBS: unk, no GBS ppx required      Will discuss with Dr. Capone mild impairment

## 2023-01-18 LAB — CORE LAB BIOPSY: NORMAL

## 2023-09-15 ENCOUNTER — RX RENEWAL (OUTPATIENT)
Age: 53
End: 2023-09-15

## 2023-10-26 ENCOUNTER — APPOINTMENT (OUTPATIENT)
Dept: FAMILY MEDICINE | Facility: CLINIC | Age: 53
End: 2023-10-26
Payer: MEDICARE

## 2023-10-26 ENCOUNTER — LABORATORY RESULT (OUTPATIENT)
Age: 53
End: 2023-10-26

## 2023-10-26 VITALS
WEIGHT: 190 LBS | HEIGHT: 63 IN | DIASTOLIC BLOOD PRESSURE: 68 MMHG | OXYGEN SATURATION: 97 % | HEART RATE: 60 BPM | BODY MASS INDEX: 33.66 KG/M2 | SYSTOLIC BLOOD PRESSURE: 110 MMHG | RESPIRATION RATE: 17 BRPM

## 2023-10-26 DIAGNOSIS — Z09 ENCOUNTER FOR FOLLOW-UP EXAMINATION AFTER COMPLETED TREATMENT FOR CONDITIONS OTHER THAN MALIGNANT NEOPLASM: ICD-10-CM

## 2023-10-26 DIAGNOSIS — G95.9 DISEASE OF SPINAL CORD, UNSPECIFIED: ICD-10-CM

## 2023-10-26 DIAGNOSIS — K59.09 OTHER CONSTIPATION: ICD-10-CM

## 2023-10-26 DIAGNOSIS — K21.9 GASTRO-ESOPHAGEAL REFLUX DISEASE W/OUT ESOPHAGITIS: ICD-10-CM

## 2023-10-26 DIAGNOSIS — Z82.49 FAMILY HISTORY OF ISCHEMIC HEART DISEASE AND OTHER DISEASES OF THE CIRCULATORY SYSTEM: ICD-10-CM

## 2023-10-26 DIAGNOSIS — G62.9 POLYNEUROPATHY, UNSPECIFIED: ICD-10-CM

## 2023-10-26 DIAGNOSIS — Z00.00 ENCOUNTER FOR GENERAL ADULT MEDICAL EXAMINATION W/OUT ABNORMAL FINDINGS: ICD-10-CM

## 2023-10-26 DIAGNOSIS — R07.89 OTHER CHEST PAIN: ICD-10-CM

## 2023-10-26 DIAGNOSIS — B35.1 TINEA UNGUIUM: ICD-10-CM

## 2023-10-26 DIAGNOSIS — Z23 ENCOUNTER FOR IMMUNIZATION: ICD-10-CM

## 2023-10-26 PROCEDURE — 90686 IIV4 VACC NO PRSV 0.5 ML IM: CPT

## 2023-10-26 PROCEDURE — 36415 COLL VENOUS BLD VENIPUNCTURE: CPT

## 2023-10-26 PROCEDURE — 99396 PREV VISIT EST AGE 40-64: CPT | Mod: 25,GY

## 2023-10-26 PROCEDURE — G0008: CPT

## 2023-10-26 RX ORDER — PSYLLIUM HUSK (WITH SUGAR) 3 G/12 G
28.3 POWDER (GRAM) ORAL TWICE DAILY
Qty: 1 | Refills: 0 | Status: ACTIVE | COMMUNITY
Start: 2023-10-26 | End: 1900-01-01

## 2023-10-26 RX ORDER — CYCLOBENZAPRINE HYDROCHLORIDE 5 MG/1
5 TABLET, FILM COATED ORAL
Qty: 60 | Refills: 2 | Status: ACTIVE | COMMUNITY
Start: 2022-05-06 | End: 1900-01-01

## 2023-10-26 RX ORDER — MECLIZINE HYDROCHLORIDE 12.5 MG/1
12.5 TABLET ORAL 3 TIMES DAILY
Qty: 90 | Refills: 2 | Status: DISCONTINUED | COMMUNITY
Start: 2022-08-22 | End: 2023-10-26

## 2023-10-26 RX ORDER — ERGOCALCIFEROL 1.25 MG/1
1.25 MG CAPSULE, LIQUID FILLED ORAL
Qty: 12 | Refills: 3 | Status: ACTIVE | COMMUNITY
Start: 2020-06-05 | End: 1900-01-01

## 2023-10-26 RX ORDER — ALBUTEROL SULFATE 90 UG/1
108 (90 BASE) AEROSOL, METERED RESPIRATORY (INHALATION)
Qty: 1 | Refills: 2 | Status: ACTIVE | OUTPATIENT
Start: 2020-01-10

## 2023-10-26 RX ORDER — ASPIRIN ENTERIC COATED TABLETS 81 MG 81 MG/1
81 TABLET, DELAYED RELEASE ORAL
Qty: 90 | Refills: 1 | Status: ACTIVE | COMMUNITY
Start: 2022-08-22 | End: 1900-01-01

## 2023-10-26 RX ORDER — CICLOPIROX 80 MG/ML
8 SOLUTION TOPICAL
Qty: 1 | Refills: 2 | Status: ACTIVE | COMMUNITY
Start: 2023-10-26 | End: 1900-01-01

## 2023-10-26 RX ORDER — TOPIRAMATE 50 MG/1
50 TABLET, FILM COATED ORAL
Qty: 90 | Refills: 1 | Status: DISCONTINUED | COMMUNITY
Start: 2021-02-04 | End: 2023-10-26

## 2023-10-26 RX ORDER — DULOXETINE HYDROCHLORIDE 30 MG/1
30 CAPSULE, DELAYED RELEASE PELLETS ORAL
Qty: 90 | Refills: 1 | Status: ACTIVE | COMMUNITY
Start: 2020-08-13 | End: 1900-01-01

## 2023-10-26 RX ORDER — APIXABAN 5 MG/1
5 TABLET, FILM COATED ORAL
Qty: 180 | Refills: 1 | Status: DISCONTINUED | COMMUNITY
Start: 2020-07-08 | End: 2023-10-26

## 2023-10-26 RX ORDER — GALCANEZUMAB 120 MG/ML
120 INJECTION, SOLUTION SUBCUTANEOUS
Refills: 0 | Status: ACTIVE | COMMUNITY

## 2023-10-26 RX ORDER — CYCLOBENZAPRINE HYDROCHLORIDE 5 MG/1
5 TABLET, FILM COATED ORAL 3 TIMES DAILY
Qty: 180 | Refills: 2 | Status: DISCONTINUED | COMMUNITY
Start: 2019-02-25 | End: 2023-10-26

## 2023-10-26 RX ORDER — LINACLOTIDE 72 UG/1
CAPSULE, GELATIN COATED ORAL
Refills: 0 | Status: DISCONTINUED | COMMUNITY
End: 2023-10-26

## 2023-10-26 RX ORDER — DOCUSATE SODIUM 100 MG/1
100 CAPSULE, LIQUID FILLED ORAL TWICE DAILY
Qty: 60 | Refills: 2 | Status: ACTIVE | COMMUNITY
Start: 2022-08-22 | End: 1900-01-01

## 2023-10-26 RX ORDER — FAMOTIDINE 20 MG/1
20 TABLET, FILM COATED ORAL
Qty: 60 | Refills: 5 | Status: ACTIVE | OUTPATIENT
Start: 2020-06-04

## 2023-10-27 DIAGNOSIS — R10.9 UNSPECIFIED ABDOMINAL PAIN: ICD-10-CM

## 2023-10-27 LAB
25(OH)D3 SERPL-MCNC: 22.9 NG/ML
ALBUMIN SERPL ELPH-MCNC: 4.5 G/DL
ALP BLD-CCNC: 107 U/L
ALT SERPL-CCNC: 17 U/L
ANION GAP SERPL CALC-SCNC: 11 MMOL/L
APPEARANCE: CLEAR
AST SERPL-CCNC: 20 U/L
BASOPHILS # BLD AUTO: 0.02 K/UL
BASOPHILS NFR BLD AUTO: 0.3 %
BILIRUB SERPL-MCNC: 0.5 MG/DL
BILIRUBIN URINE: NEGATIVE
BLOOD URINE: NEGATIVE
BUN SERPL-MCNC: 10 MG/DL
CALCIUM SERPL-MCNC: 9.4 MG/DL
CHLORIDE SERPL-SCNC: 105 MMOL/L
CHOLEST SERPL-MCNC: 265 MG/DL
CO2 SERPL-SCNC: 28 MMOL/L
COLOR: YELLOW
CREAT SERPL-MCNC: 0.82 MG/DL
EGFR: 86 ML/MIN/1.73M2
EOSINOPHIL # BLD AUTO: 0.1 K/UL
EOSINOPHIL NFR BLD AUTO: 1.7 %
ESTIMATED AVERAGE GLUCOSE: 108 MG/DL
GLUCOSE QUALITATIVE U: NEGATIVE MG/DL
GLUCOSE SERPL-MCNC: 80 MG/DL
HBA1C MFR BLD HPLC: 5.4 %
HCT VFR BLD CALC: 40.7 %
HDLC SERPL-MCNC: 67 MG/DL
HGB BLD-MCNC: 13.2 G/DL
IMM GRANULOCYTES NFR BLD AUTO: 0.2 %
KETONES URINE: NEGATIVE MG/DL
LDLC SERPL CALC-MCNC: 170 MG/DL
LEUKOCYTE ESTERASE URINE: ABNORMAL
LYMPHOCYTES # BLD AUTO: 2.18 K/UL
LYMPHOCYTES NFR BLD AUTO: 36.3 %
MAN DIFF?: NORMAL
MCHC RBC-ENTMCNC: 29.9 PG
MCHC RBC-ENTMCNC: 32.4 GM/DL
MCV RBC AUTO: 92.3 FL
MONOCYTES # BLD AUTO: 0.47 K/UL
MONOCYTES NFR BLD AUTO: 7.8 %
NEUTROPHILS # BLD AUTO: 3.22 K/UL
NEUTROPHILS NFR BLD AUTO: 53.7 %
NITRITE URINE: NEGATIVE
NONHDLC SERPL-MCNC: 198 MG/DL
PH URINE: 6
PLATELET # BLD AUTO: 181 K/UL
POTASSIUM SERPL-SCNC: 4.4 MMOL/L
PROT SERPL-MCNC: 7.2 G/DL
PROTEIN URINE: NORMAL MG/DL
RBC # BLD: 4.41 M/UL
RBC # FLD: 13.4 %
SODIUM SERPL-SCNC: 143 MMOL/L
SPECIFIC GRAVITY URINE: 1.01
TRIGL SERPL-MCNC: 156 MG/DL
TSH SERPL-ACNC: 1.48 UIU/ML
UROBILINOGEN URINE: 0.2 MG/DL
WBC # FLD AUTO: 6 K/UL

## 2023-10-28 ENCOUNTER — RX CHANGE (OUTPATIENT)
Age: 53
End: 2023-10-28

## 2023-11-14 RX ORDER — SEMAGLUTIDE 0.25 MG/.5ML
0.25 INJECTION, SOLUTION SUBCUTANEOUS
Qty: 1 | Refills: 0 | Status: ACTIVE | COMMUNITY
Start: 2023-10-27

## 2023-12-01 ENCOUNTER — APPOINTMENT (OUTPATIENT)
Dept: MRI IMAGING | Facility: CLINIC | Age: 53
End: 2023-12-01
Payer: MEDICARE

## 2023-12-01 ENCOUNTER — OUTPATIENT (OUTPATIENT)
Dept: OUTPATIENT SERVICES | Facility: HOSPITAL | Age: 53
LOS: 1 days | End: 2023-12-01
Payer: MEDICARE

## 2023-12-01 DIAGNOSIS — Z90.710 ACQUIRED ABSENCE OF BOTH CERVIX AND UTERUS: Chronic | ICD-10-CM

## 2023-12-01 DIAGNOSIS — K86.2 CYST OF PANCREAS: ICD-10-CM

## 2023-12-01 DIAGNOSIS — Z86.018 PERSONAL HISTORY OF OTHER BENIGN NEOPLASM: Chronic | ICD-10-CM

## 2023-12-01 DIAGNOSIS — Z98.890 OTHER SPECIFIED POSTPROCEDURAL STATES: Chronic | ICD-10-CM

## 2023-12-01 DIAGNOSIS — Z98.51 TUBAL LIGATION STATUS: Chronic | ICD-10-CM

## 2023-12-01 DIAGNOSIS — Z00.8 ENCOUNTER FOR OTHER GENERAL EXAMINATION: ICD-10-CM

## 2023-12-01 PROCEDURE — 74183 MRI ABD W/O CNTR FLWD CNTR: CPT | Mod: 26,MH

## 2023-12-01 PROCEDURE — A9585: CPT

## 2023-12-01 PROCEDURE — 74183 MRI ABD W/O CNTR FLWD CNTR: CPT

## 2023-12-21 ENCOUNTER — APPOINTMENT (OUTPATIENT)
Dept: CT IMAGING | Facility: CLINIC | Age: 53
End: 2023-12-21
Payer: MEDICARE

## 2023-12-21 ENCOUNTER — OUTPATIENT (OUTPATIENT)
Dept: OUTPATIENT SERVICES | Facility: HOSPITAL | Age: 53
LOS: 1 days | End: 2023-12-21
Payer: MEDICARE

## 2023-12-21 DIAGNOSIS — Z98.51 TUBAL LIGATION STATUS: Chronic | ICD-10-CM

## 2023-12-21 DIAGNOSIS — R10.32 LEFT LOWER QUADRANT PAIN: ICD-10-CM

## 2023-12-21 DIAGNOSIS — Z98.890 OTHER SPECIFIED POSTPROCEDURAL STATES: Chronic | ICD-10-CM

## 2023-12-21 DIAGNOSIS — Z86.018 PERSONAL HISTORY OF OTHER BENIGN NEOPLASM: Chronic | ICD-10-CM

## 2023-12-21 DIAGNOSIS — Z90.710 ACQUIRED ABSENCE OF BOTH CERVIX AND UTERUS: Chronic | ICD-10-CM

## 2023-12-21 PROCEDURE — 74176 CT ABD & PELVIS W/O CONTRAST: CPT | Mod: 26,MH

## 2023-12-21 PROCEDURE — 74176 CT ABD & PELVIS W/O CONTRAST: CPT | Mod: MH

## 2024-01-23 ENCOUNTER — APPOINTMENT (OUTPATIENT)
Dept: FAMILY MEDICINE | Facility: CLINIC | Age: 54
End: 2024-01-23
Payer: MEDICARE

## 2024-01-23 VITALS
HEIGHT: 63 IN | OXYGEN SATURATION: 97 % | HEART RATE: 76 BPM | SYSTOLIC BLOOD PRESSURE: 115 MMHG | WEIGHT: 186 LBS | BODY MASS INDEX: 32.96 KG/M2 | DIASTOLIC BLOOD PRESSURE: 83 MMHG | RESPIRATION RATE: 16 BRPM | TEMPERATURE: 97.9 F

## 2024-01-23 DIAGNOSIS — E55.9 VITAMIN D DEFICIENCY, UNSPECIFIED: ICD-10-CM

## 2024-01-23 DIAGNOSIS — E66.9 OBESITY, UNSPECIFIED: ICD-10-CM

## 2024-01-23 DIAGNOSIS — G40.909 EPILEPSY, UNSPECIFIED, NOT INTRACTABLE, W/OUT STATUS EPILEPTICUS: ICD-10-CM

## 2024-01-23 DIAGNOSIS — Z74.09 OTHER REDUCED MOBILITY: ICD-10-CM

## 2024-01-23 DIAGNOSIS — G43.909 MIGRAINE, UNSPECIFIED, NOT INTRACTABLE, W/OUT STATUS MIGRAINOSUS: ICD-10-CM

## 2024-01-23 DIAGNOSIS — M79.672 PAIN IN RIGHT FOOT: ICD-10-CM

## 2024-01-23 DIAGNOSIS — G89.29 PAIN IN RIGHT FOOT: ICD-10-CM

## 2024-01-23 DIAGNOSIS — M79.671 PAIN IN RIGHT FOOT: ICD-10-CM

## 2024-01-23 DIAGNOSIS — M54.16 RADICULOPATHY, LUMBAR REGION: ICD-10-CM

## 2024-01-23 DIAGNOSIS — M54.50 LOW BACK PAIN, UNSPECIFIED: ICD-10-CM

## 2024-01-23 DIAGNOSIS — Z76.0 ENCOUNTER FOR ISSUE OF REPEAT PRESCRIPTION: ICD-10-CM

## 2024-01-23 DIAGNOSIS — M79.642 PAIN IN RIGHT HAND: ICD-10-CM

## 2024-01-23 DIAGNOSIS — M79.641 PAIN IN RIGHT HAND: ICD-10-CM

## 2024-01-23 PROCEDURE — 99214 OFFICE O/P EST MOD 30 MIN: CPT | Mod: 25

## 2024-01-23 PROCEDURE — 36415 COLL VENOUS BLD VENIPUNCTURE: CPT

## 2024-01-23 NOTE — HISTORY OF PRESENT ILLNESS
[FreeTextEntry1] : here for bw and multiple referrals c/o chronic pain, headache, and trouble w/ walking c/o hand pain, states PT advised to f/u w/ hand specialist

## 2024-01-24 LAB
25(OH)D3 SERPL-MCNC: 40.3 NG/ML
ALBUMIN SERPL ELPH-MCNC: 4.4 G/DL
ALP BLD-CCNC: 118 U/L
ALT SERPL-CCNC: 31 U/L
ANION GAP SERPL CALC-SCNC: 14 MMOL/L
AST SERPL-CCNC: 23 U/L
BILIRUB SERPL-MCNC: 0.8 MG/DL
BUN SERPL-MCNC: 12 MG/DL
CALCIUM SERPL-MCNC: 9.3 MG/DL
CHLORIDE SERPL-SCNC: 102 MMOL/L
CHOLEST SERPL-MCNC: 173 MG/DL
CO2 SERPL-SCNC: 25 MMOL/L
CREAT SERPL-MCNC: 0.77 MG/DL
EGFR: 93 ML/MIN/1.73M2
ESTIMATED AVERAGE GLUCOSE: 103 MG/DL
GLUCOSE SERPL-MCNC: 92 MG/DL
HBA1C MFR BLD HPLC: 5.2 %
HDLC SERPL-MCNC: 68 MG/DL
LDLC SERPL CALC-MCNC: 92 MG/DL
NONHDLC SERPL-MCNC: 105 MG/DL
POTASSIUM SERPL-SCNC: 3.9 MMOL/L
PROT SERPL-MCNC: 6.7 G/DL
SODIUM SERPL-SCNC: 141 MMOL/L
TRIGL SERPL-MCNC: 72 MG/DL

## 2024-01-24 RX ORDER — ATORVASTATIN CALCIUM 40 MG/1
40 TABLET, FILM COATED ORAL AT BEDTIME
Qty: 135 | Refills: 1 | Status: ACTIVE | COMMUNITY
Start: 2020-07-08 | End: 1900-01-01

## 2024-02-27 ENCOUNTER — RX CHANGE (OUTPATIENT)
Age: 54
End: 2024-02-27

## 2024-03-11 RX ORDER — ATORVASTATIN CALCIUM 40 MG/1
40 TABLET, FILM COATED ORAL
Qty: 90 | Refills: 1 | Status: ACTIVE | COMMUNITY
Start: 2024-03-11 | End: 1900-01-01

## 2024-03-11 RX ORDER — ATORVASTATIN CALCIUM 20 MG/1
20 TABLET, FILM COATED ORAL
Qty: 1 | Refills: 1 | Status: ACTIVE | COMMUNITY
Start: 2024-03-11 | End: 1900-01-01

## 2024-05-01 ENCOUNTER — APPOINTMENT (OUTPATIENT)
Dept: FAMILY MEDICINE | Facility: CLINIC | Age: 54
End: 2024-05-01
Payer: MEDICARE

## 2024-05-01 VITALS
RESPIRATION RATE: 16 BRPM | DIASTOLIC BLOOD PRESSURE: 76 MMHG | TEMPERATURE: 98.1 F | SYSTOLIC BLOOD PRESSURE: 113 MMHG | BODY MASS INDEX: 34.02 KG/M2 | HEIGHT: 63 IN | OXYGEN SATURATION: 98 % | HEART RATE: 58 BPM | WEIGHT: 192 LBS

## 2024-05-01 DIAGNOSIS — I82.501 CHRONIC EMBOLISM AND THROMBOSIS OF UNSPECIFIED DEEP VEINS OF RIGHT LOWER EXTREMITY: ICD-10-CM

## 2024-05-01 DIAGNOSIS — E78.5 HYPERLIPIDEMIA, UNSPECIFIED: ICD-10-CM

## 2024-05-01 DIAGNOSIS — F32.A DEPRESSION, UNSPECIFIED: ICD-10-CM

## 2024-05-01 DIAGNOSIS — Z01.818 ENCOUNTER FOR OTHER PREPROCEDURAL EXAMINATION: ICD-10-CM

## 2024-05-01 DIAGNOSIS — G56.00 CARPAL TUNNEL SYNDROME, UNSPECIFIED UPPER LIMB: ICD-10-CM

## 2024-05-01 PROCEDURE — 99215 OFFICE O/P EST HI 40 MIN: CPT

## 2024-05-02 PROBLEM — Z01.818 PREOPERATIVE CLEARANCE: Status: ACTIVE | Noted: 2018-09-06

## 2024-05-02 RX ORDER — BUPROPION HYDROCHLORIDE 150 MG/1
150 TABLET, EXTENDED RELEASE ORAL DAILY
Qty: 90 | Refills: 1 | Status: ACTIVE | COMMUNITY
Start: 2024-05-02 | End: 1900-01-01

## 2024-05-02 NOTE — PLAN
[FreeTextEntry1] : Patient is cleared for surgery, however, patient is suffering from depression r/t to complicated grieving, disability, and poor self -esteem. discussed starting bupropion, and therapy. denies suicidal ideations, risky behavior, drug/etoh abuse. Has family support

## 2024-05-02 NOTE — HISTORY OF PRESENT ILLNESS
[No Pertinent Cardiac History] : no history of aortic stenosis, atrial fibrillation, coronary artery disease, recent myocardial infarction, or implantable device/pacemaker [No Pertinent Pulmonary History] : no history of asthma, COPD, sleep apnea, or smoking [No Adverse Anesthesia Reaction] : no adverse anesthesia reaction in self or family member [Chronic Anticoagulation] : chronic anticoagulation [(Patient denies any chest pain, claudication, dyspnea on exertion, orthopnea, palpitations or syncope)] : Patient denies any chest pain, claudication, dyspnea on exertion, orthopnea, palpitations or syncope [Chronic Kidney Disease] : no chronic kidney disease [Diabetes] : no diabetes [Anti-Platelet Agents: _____] : Anti-Platelet Agents: [unfilled] [FreeTextEntry1] : right carpel maggie

## 2024-05-02 NOTE — ASSESSMENT
[Patient Optimized for Surgery] : Patient optimized for surgery [No Further Testing Recommended] : no further testing recommended [Modify anti-platelet treatment prior to procedure] : Modify anti-platelet treatment prior to procedure [As per surgery] : as per surgery [FreeTextEntry6] : stop ASA today

## 2024-05-02 NOTE — RESULTS/DATA
[] : results reviewed [de-identified] : wnl [de-identified] : wnl [de-identified] : wnl [de-identified] : wnl

## 2024-06-18 ENCOUNTER — APPOINTMENT (OUTPATIENT)
Dept: ENDOCRINOLOGY | Facility: CLINIC | Age: 54
End: 2024-06-18

## 2024-09-25 ENCOUNTER — APPOINTMENT (OUTPATIENT)
Dept: FAMILY MEDICINE | Facility: CLINIC | Age: 54
End: 2024-09-25
Payer: MEDICARE

## 2024-09-25 VITALS
OXYGEN SATURATION: 97 % | SYSTOLIC BLOOD PRESSURE: 112 MMHG | HEIGHT: 63 IN | WEIGHT: 190 LBS | RESPIRATION RATE: 16 BRPM | HEART RATE: 68 BPM | BODY MASS INDEX: 33.66 KG/M2 | DIASTOLIC BLOOD PRESSURE: 79 MMHG

## 2024-09-25 DIAGNOSIS — M54.16 RADICULOPATHY, LUMBAR REGION: ICD-10-CM

## 2024-09-25 DIAGNOSIS — G62.9 POLYNEUROPATHY, UNSPECIFIED: ICD-10-CM

## 2024-09-25 PROCEDURE — 99214 OFFICE O/P EST MOD 30 MIN: CPT

## 2024-09-27 NOTE — INTERPRETER SERVICES
[Time Spent: ____ minutes] : Total time spent using  services: [unfilled] minutes. The patient's primary language is not English thus required  services. [Interpreters_IDNumber] : [id number] [Interpreters_FullName] : Blanca

## 2024-09-27 NOTE — HISTORY OF PRESENT ILLNESS
[FreeTextEntry1] : follow up  [de-identified] : Ms. YANELI WHITLOCK is a 54 year old female presents today for follow up and to discuss the EMG result.  Reports seen by podiatrist for foot pain with numbness and tingling. Had an EMG done which shows bilateral peripheral neuropathy. Pt has hx of lumbar radiculopathy and poly neuropathy. Currently on Gabapentin. States gabapentin is not effective.  Pt prefers to see an ortho specialist for both back pain and knee pain.  under the care of Neurologist - Dr. Velasquez for Migraine.

## 2024-09-27 NOTE — ASSESSMENT
[FreeTextEntry1] : #lumbar radiculopathy  counseled  Stretching exercises  Consider PT - pt deferring at this time  continue gabapentin  apply Lido derm patch  Ortho referral  # peripheral neuropathy Referral to neurologist.  F/u with Dr. Velasquez.

## 2024-09-27 NOTE — HISTORY OF PRESENT ILLNESS
[FreeTextEntry1] : follow up  [de-identified] : Ms. YANELI WHITLOCK is a 54 year old female presents today for follow up and to discuss the EMG result.  Reports seen by podiatrist for foot pain with numbness and tingling. Had an EMG done which shows bilateral peripheral neuropathy. Pt has hx of lumbar radiculopathy and poly neuropathy. Currently on Gabapentin. States gabapentin is not effective.  Pt prefers to see an ortho specialist for both back pain and knee pain.  under the care of Neurologist - Dr. Velasquez for Migraine.

## 2024-09-27 NOTE — PHYSICAL EXAM
[No Acute Distress] : no acute distress [Well-Appearing] : well-appearing [No Respiratory Distress] : no respiratory distress  [No Accessory Muscle Use] : no accessory muscle use [Clear to Auscultation] : lungs were clear to auscultation bilaterally [Normal Rate] : normal rate  [Regular Rhythm] : with a regular rhythm [Normal S1, S2] : normal S1 and S2 [No Murmur] : no murmur heard [de-identified] : paraspinal tenderness lumbar area.

## 2024-09-27 NOTE — INTERPRETER SERVICES
[Time Spent: ____ minutes] : Total time spent using  services: [unfilled] minutes. The patient's primary language is not English thus required  services. [Interpreters_IDNumber] : [id number] [Interpreters_FullName] : Blanca

## 2024-10-01 ENCOUNTER — RX RENEWAL (OUTPATIENT)
Age: 54
End: 2024-10-01

## 2024-10-01 RX ORDER — LIDOCAINE 5% 700 MG/1
5 PATCH TOPICAL
Qty: 1 | Refills: 2 | Status: ACTIVE | COMMUNITY
Start: 2024-09-25

## 2024-10-04 ENCOUNTER — APPOINTMENT (OUTPATIENT)
Dept: ORTHOPEDIC SURGERY | Facility: CLINIC | Age: 54
End: 2024-10-04

## 2024-10-24 ENCOUNTER — APPOINTMENT (OUTPATIENT)
Dept: FAMILY MEDICINE | Facility: CLINIC | Age: 54
End: 2024-10-24

## 2024-12-30 ENCOUNTER — RX RENEWAL (OUTPATIENT)
Age: 54
End: 2024-12-30

## 2025-04-23 ENCOUNTER — LABORATORY RESULT (OUTPATIENT)
Age: 55
End: 2025-04-23

## 2025-04-23 ENCOUNTER — APPOINTMENT (OUTPATIENT)
Dept: FAMILY MEDICINE | Facility: CLINIC | Age: 55
End: 2025-04-23
Payer: MEDICARE

## 2025-04-23 ENCOUNTER — NON-APPOINTMENT (OUTPATIENT)
Age: 55
End: 2025-04-23

## 2025-04-23 VITALS
WEIGHT: 190 LBS | OXYGEN SATURATION: 98 % | HEART RATE: 61 BPM | DIASTOLIC BLOOD PRESSURE: 82 MMHG | RESPIRATION RATE: 16 BRPM | HEIGHT: 63 IN | BODY MASS INDEX: 33.66 KG/M2 | SYSTOLIC BLOOD PRESSURE: 121 MMHG

## 2025-04-23 DIAGNOSIS — I63.9 CEREBRAL INFARCTION, UNSPECIFIED: ICD-10-CM

## 2025-04-23 DIAGNOSIS — G89.29 PAIN IN RIGHT FOOT: ICD-10-CM

## 2025-04-23 DIAGNOSIS — Z74.09 OTHER REDUCED MOBILITY: ICD-10-CM

## 2025-04-23 DIAGNOSIS — M79.642 PAIN IN RIGHT HAND: ICD-10-CM

## 2025-04-23 DIAGNOSIS — M79.641 PAIN IN RIGHT HAND: ICD-10-CM

## 2025-04-23 DIAGNOSIS — Z00.00 ENCOUNTER FOR GENERAL ADULT MEDICAL EXAMINATION W/OUT ABNORMAL FINDINGS: ICD-10-CM

## 2025-04-23 DIAGNOSIS — Z01.818 ENCOUNTER FOR OTHER PREPROCEDURAL EXAMINATION: ICD-10-CM

## 2025-04-23 DIAGNOSIS — M54.16 RADICULOPATHY, LUMBAR REGION: ICD-10-CM

## 2025-04-23 DIAGNOSIS — R10.9 UNSPECIFIED ABDOMINAL PAIN: ICD-10-CM

## 2025-04-23 DIAGNOSIS — K21.9 GASTRO-ESOPHAGEAL REFLUX DISEASE W/OUT ESOPHAGITIS: ICD-10-CM

## 2025-04-23 DIAGNOSIS — E78.5 HYPERLIPIDEMIA, UNSPECIFIED: ICD-10-CM

## 2025-04-23 DIAGNOSIS — E66.811 OBESITY, CLASS 1: ICD-10-CM

## 2025-04-23 DIAGNOSIS — M79.671 PAIN IN RIGHT FOOT: ICD-10-CM

## 2025-04-23 DIAGNOSIS — M79.672 PAIN IN RIGHT FOOT: ICD-10-CM

## 2025-04-23 DIAGNOSIS — G62.9 POLYNEUROPATHY, UNSPECIFIED: ICD-10-CM

## 2025-04-23 DIAGNOSIS — I82.409 ACUTE EMBOLISM AND THROMBOSIS OF UNSPECIFIED DEEP VEINS OF UNSPECIFIED LOWER EXTREMITY: ICD-10-CM

## 2025-04-23 DIAGNOSIS — Z76.0 ENCOUNTER FOR ISSUE OF REPEAT PRESCRIPTION: ICD-10-CM

## 2025-04-23 DIAGNOSIS — K59.09 OTHER CONSTIPATION: ICD-10-CM

## 2025-04-23 PROCEDURE — 99214 OFFICE O/P EST MOD 30 MIN: CPT | Mod: 25

## 2025-04-23 PROCEDURE — 36415 COLL VENOUS BLD VENIPUNCTURE: CPT

## 2025-04-23 PROCEDURE — 93000 ELECTROCARDIOGRAM COMPLETE: CPT

## 2025-04-23 PROCEDURE — G0439: CPT

## 2025-04-23 RX ORDER — PSYLLIUM HUSK (WITH SUGAR) 3 G/12 G
28.3 POWDER (GRAM) ORAL TWICE DAILY
Qty: 1 | Refills: 3 | Status: ACTIVE | COMMUNITY
Start: 2025-04-23 | End: 1900-01-01

## 2025-04-24 LAB
25(OH)D3 SERPL-MCNC: 26.6 NG/ML
ALBUMIN SERPL ELPH-MCNC: 4.6 G/DL
ALP BLD-CCNC: 126 U/L
ALT SERPL-CCNC: 13 U/L
ANION GAP SERPL CALC-SCNC: 13 MMOL/L
APPEARANCE: CLEAR
AST SERPL-CCNC: 19 U/L
BASOPHILS # BLD AUTO: 0.03 K/UL
BASOPHILS NFR BLD AUTO: 0.5 %
BILIRUB SERPL-MCNC: 0.5 MG/DL
BILIRUBIN URINE: NEGATIVE
BLOOD URINE: NEGATIVE
BUN SERPL-MCNC: 10 MG/DL
CALCIUM SERPL-MCNC: 9.5 MG/DL
CHLORIDE SERPL-SCNC: 104 MMOL/L
CHOLEST SERPL-MCNC: 268 MG/DL
CK SERPL-CCNC: 89 U/L
CO2 SERPL-SCNC: 26 MMOL/L
COLOR: YELLOW
CREAT SERPL-MCNC: 0.88 MG/DL
CRP SERPL-MCNC: <3 MG/L
EGFRCR SERPLBLD CKD-EPI 2021: 79 ML/MIN/1.73M2
EOSINOPHIL # BLD AUTO: 0.13 K/UL
EOSINOPHIL NFR BLD AUTO: 2.1 %
ERYTHROCYTE [SEDIMENTATION RATE] IN BLOOD BY WESTERGREN METHOD: 21 MM/HR
GLUCOSE QUALITATIVE U: NEGATIVE MG/DL
GLUCOSE SERPL-MCNC: 88 MG/DL
HCT VFR BLD CALC: 40.6 %
HDLC SERPL-MCNC: 73 MG/DL
HGB BLD-MCNC: 13.4 G/DL
IMM GRANULOCYTES NFR BLD AUTO: 0.2 %
KETONES URINE: NEGATIVE MG/DL
LDLC SERPL-MCNC: 178 MG/DL
LEUKOCYTE ESTERASE URINE: ABNORMAL
LYMPHOCYTES # BLD AUTO: 1.97 K/UL
LYMPHOCYTES NFR BLD AUTO: 32.5 %
MAGNESIUM SERPL-MCNC: 2.1 MG/DL
MAN DIFF?: NORMAL
MCHC RBC-ENTMCNC: 30 PG
MCHC RBC-ENTMCNC: 33 G/DL
MCV RBC AUTO: 91 FL
MONOCYTES # BLD AUTO: 0.37 K/UL
MONOCYTES NFR BLD AUTO: 6.1 %
NEUTROPHILS # BLD AUTO: 3.56 K/UL
NEUTROPHILS NFR BLD AUTO: 58.6 %
NITRITE URINE: NEGATIVE
NONHDLC SERPL-MCNC: 195 MG/DL
PH URINE: 6
PHOSPHATE SERPL-MCNC: 3.9 MG/DL
PLATELET # BLD AUTO: 176 K/UL
POTASSIUM SERPL-SCNC: 4.1 MMOL/L
PROT SERPL-MCNC: 7.2 G/DL
PROTEIN URINE: NEGATIVE MG/DL
RBC # BLD: 4.46 M/UL
RBC # FLD: 13.3 %
RHEUMATOID FACT SER QL: <10 IU/ML
SODIUM SERPL-SCNC: 143 MMOL/L
SPECIFIC GRAVITY URINE: 1.01
TRIGL SERPL-MCNC: 96 MG/DL
TSH SERPL-ACNC: 1.42 UIU/ML
URATE SERPL-MCNC: 6.4 MG/DL
UROBILINOGEN URINE: 0.2 MG/DL
WBC # FLD AUTO: 6.07 K/UL

## 2025-04-24 RX ORDER — SEMAGLUTIDE 0.25 MG/.5ML
0.25 INJECTION, SOLUTION SUBCUTANEOUS
Qty: 1 | Refills: 2 | Status: ACTIVE | COMMUNITY
Start: 2025-04-23

## 2025-04-25 LAB
ESTIMATED AVERAGE GLUCOSE: 105 MG/DL
HBA1C MFR BLD HPLC: 5.3 %

## 2025-04-26 DIAGNOSIS — B95.1 URINARY TRACT INFECTION, SITE NOT SPECIFIED: ICD-10-CM

## 2025-04-26 DIAGNOSIS — N39.0 URINARY TRACT INFECTION, SITE NOT SPECIFIED: ICD-10-CM

## 2025-04-26 RX ORDER — AMPICILLIN 500 MG/1
500 CAPSULE ORAL 4 TIMES DAILY
Qty: 28 | Refills: 0 | Status: ACTIVE | COMMUNITY
Start: 2025-04-26 | End: 1900-01-01

## 2025-04-29 LAB — ANA SER IF-ACNC: NEGATIVE
